# Patient Record
Sex: FEMALE | Race: WHITE | NOT HISPANIC OR LATINO
[De-identification: names, ages, dates, MRNs, and addresses within clinical notes are randomized per-mention and may not be internally consistent; named-entity substitution may affect disease eponyms.]

---

## 2020-08-13 ENCOUNTER — APPOINTMENT (OUTPATIENT)
Dept: DISASTER EMERGENCY | Facility: CLINIC | Age: 75
End: 2020-08-13

## 2020-08-17 LAB — SARS-COV-2 N GENE NPH QL NAA+PROBE: NOT DETECTED

## 2021-06-09 ENCOUNTER — OUTPATIENT (OUTPATIENT)
Dept: OUTPATIENT SERVICES | Facility: HOSPITAL | Age: 76
LOS: 1 days | Discharge: HOME | End: 2021-06-09
Payer: MEDICARE

## 2021-06-09 VITALS
TEMPERATURE: 98 F | HEART RATE: 66 BPM | WEIGHT: 152.78 LBS | DIASTOLIC BLOOD PRESSURE: 82 MMHG | RESPIRATION RATE: 16 BRPM | HEIGHT: 57 IN | SYSTOLIC BLOOD PRESSURE: 138 MMHG | OXYGEN SATURATION: 96 %

## 2021-06-09 DIAGNOSIS — Z01.818 ENCOUNTER FOR OTHER PREPROCEDURAL EXAMINATION: ICD-10-CM

## 2021-06-09 DIAGNOSIS — Z98.890 OTHER SPECIFIED POSTPROCEDURAL STATES: Chronic | ICD-10-CM

## 2021-06-09 DIAGNOSIS — N32.81 OVERACTIVE BLADDER: ICD-10-CM

## 2021-06-09 DIAGNOSIS — Z90.89 ACQUIRED ABSENCE OF OTHER ORGANS: Chronic | ICD-10-CM

## 2021-06-09 DIAGNOSIS — M19.011 PRIMARY OSTEOARTHRITIS, RIGHT SHOULDER: ICD-10-CM

## 2021-06-09 DIAGNOSIS — I10 ESSENTIAL (PRIMARY) HYPERTENSION: ICD-10-CM

## 2021-06-09 DIAGNOSIS — E07.9 DISORDER OF THYROID, UNSPECIFIED: ICD-10-CM

## 2021-06-09 LAB
ANION GAP SERPL CALC-SCNC: 8 MMOL/L — SIGNIFICANT CHANGE UP (ref 5–17)
APTT BLD: 31.3 SEC — SIGNIFICANT CHANGE UP (ref 27.5–35.5)
BLD GP AB SCN SERPL QL: SIGNIFICANT CHANGE UP
BUN SERPL-MCNC: 12 MG/DL — SIGNIFICANT CHANGE UP (ref 7–23)
CALCIUM SERPL-MCNC: 9.1 MG/DL — SIGNIFICANT CHANGE UP (ref 8.5–10.1)
CHLORIDE SERPL-SCNC: 107 MMOL/L — SIGNIFICANT CHANGE UP (ref 96–108)
CO2 SERPL-SCNC: 27 MMOL/L — SIGNIFICANT CHANGE UP (ref 22–31)
CREAT SERPL-MCNC: 1.02 MG/DL — SIGNIFICANT CHANGE UP (ref 0.5–1.3)
GLUCOSE SERPL-MCNC: 84 MG/DL — SIGNIFICANT CHANGE UP (ref 70–99)
HCT VFR BLD CALC: 37.6 % — SIGNIFICANT CHANGE UP (ref 34.5–45)
HGB BLD-MCNC: 12.7 G/DL — SIGNIFICANT CHANGE UP (ref 11.5–15.5)
INR BLD: 1.04 RATIO — SIGNIFICANT CHANGE UP (ref 0.88–1.16)
MCHC RBC-ENTMCNC: 29.7 PG — SIGNIFICANT CHANGE UP (ref 27–34)
MCHC RBC-ENTMCNC: 33.8 GM/DL — SIGNIFICANT CHANGE UP (ref 32–36)
MCV RBC AUTO: 88.1 FL — SIGNIFICANT CHANGE UP (ref 80–100)
NRBC # BLD: 0 /100 WBCS — SIGNIFICANT CHANGE UP (ref 0–0)
PLATELET # BLD AUTO: 273 K/UL — SIGNIFICANT CHANGE UP (ref 150–400)
POTASSIUM SERPL-MCNC: 4.4 MMOL/L — SIGNIFICANT CHANGE UP (ref 3.5–5.3)
POTASSIUM SERPL-SCNC: 4.4 MMOL/L — SIGNIFICANT CHANGE UP (ref 3.5–5.3)
PROTHROM AB SERPL-ACNC: 12 SEC — SIGNIFICANT CHANGE UP (ref 10.6–13.6)
RBC # BLD: 4.27 M/UL — SIGNIFICANT CHANGE UP (ref 3.8–5.2)
RBC # FLD: 13.4 % — SIGNIFICANT CHANGE UP (ref 10.3–14.5)
SODIUM SERPL-SCNC: 142 MMOL/L — SIGNIFICANT CHANGE UP (ref 135–145)
WBC # BLD: 5.4 K/UL — SIGNIFICANT CHANGE UP (ref 3.8–10.5)
WBC # FLD AUTO: 5.4 K/UL — SIGNIFICANT CHANGE UP (ref 3.8–10.5)

## 2021-06-09 PROCEDURE — 93010 ELECTROCARDIOGRAM REPORT: CPT

## 2021-06-09 NOTE — H&P PST ADULT - ASSESSMENT
75F pmh htn, gerd, thyroid disease, OAB c/o right shoulder pain 2/2 primary osteoarthritis here for PST for scheduled Right reverse shoulder arthroplasty  CAPRINI SCORE    AGE RELATED RISK FACTORS                                                       MOBILITY RELATED FACTORS  [ ] Age 41-60 years                                            (1 Point)                  [ ] Bed rest                                                        (1 Point)  [x ] Age: 61-74 years                                           (2 Points)                [ ] Plaster cast                                                   (2 Points)  [ ] Age= 75 years                                              (3 Points)                 [ ] Bed bound for more than 72 hours                   (2 Points)    DISEASE RELATED RISK FACTORS                                               GENDER SPECIFIC FACTORS  [ ] Edema in the lower extremities                       (1 Point)                  [ ] Pregnancy                                                     (1 Point)  [ ] Varicose veins                                               (1 Point)                  [ ] Post-partum < 6 weeks                                   (1 Point)             [x ] BMI > 25 Kg/m2                                            (1 Point)                  [ ] Hormonal therapy  or oral contraception            (1 Point)                 [ ] Sepsis (in the previous month)                        (1 Point)                  [ ] History of pregnancy complications  [ ] Pneumonia or serious lung disease                                               [ ] Unexplained or recurrent                       (1 Point)           (in the previous month)                               (1 Point)  [ ] Abnormal pulmonary function test                     (1 Point)                 SURGERY RELATED RISK FACTORS  [ ] Acute myocardial infarction                              (1 Point)                 [ ]  Section                                            (1 Point)  [ ] Congestive heart failure (in the previous month)  (1 Point)                 [ ] Minor surgery                                                 (1 Point)   [ ] Inflammatory bowel disease                             (1 Point)                 [ ] Arthroscopic surgery                                        (2 Points)  [ ] Central venous access                                    (2 Points)                [ ] General surgery lasting more than 45 minutes   (2 Points)       [ ] Stroke (in the previous month)                          (5 Points)               x[ ] Elective arthroplasty                                        (5 Points)                                                                                                                                               HEMATOLOGY RELATED FACTORS                                                 TRAUMA RELATED RISK FACTORS  [ ] Prior episodes of VTE                                     (3 Points)                 [ ] Fracture of the hip, pelvis, or leg                       (5 Points)  [ ] Positive family history for VTE                         (3 Points)                 [ ] Acute spinal cord injury (in the previous month)  (5 Points)  [ ] Prothrombin 30457 A                                      (3 Points)                 [ ] Paralysis  (less than 1 month)                          (5 Points)  [ ] Factor V Leiden                                             (3 Points)                 [ ] Multiple Trauma within 1 month                         (5 Points)  [ ] Lupus anticoagulants                                     (3 Points)                                                           [ ] Anticardiolipin antibodies                                (3 Points)                                                       [ ] High homocysteine in the blood                      (3 Points)                                             [ ] Other congenital or acquired thrombophilia       (3 Points)                                                [ ] Heparin induced thrombocytopenia                  (3 Points)                                          Total Score [    8      ]

## 2021-06-09 NOTE — H&P PST ADULT - NSICDXPROBLEM_GEN_ALL_CORE_FT
PROBLEM DIAGNOSES  Problem: Primary osteoarthritis, right shoulder  Assessment and Plan: Right reverse shoulder arthroplasty  labs - cbc,pt/ptt,bmp,t&s,nose cx,ekg  M/C required  preop 3 day hibiclens instruction reviewed and given .instructed on if  nose cx positive use mupuricin 5 days and checklist given  take routine meds DOS with sips of water. avoid NSAID and OTC supplements. verbalized understanding  information on proper nutrition , increase protein and better food choices provided in packet   Ensure clear given      Problem: Thyroid disease  Assessment and Plan: Continue current regimen and medications.     Problem: HTN (hypertension)  Assessment and Plan: Continue current regimen and medications.     Problem: OAB (overactive bladder)  Assessment and Plan: Continue current regimen and medications.

## 2021-06-10 LAB
A1C WITH ESTIMATED AVERAGE GLUCOSE RESULT: 5.9 % — HIGH (ref 4–5.6)
ESTIMATED AVERAGE GLUCOSE: 123 MG/DL — HIGH (ref 68–114)
MRSA PCR RESULT.: SIGNIFICANT CHANGE UP
S AUREUS DNA NOSE QL NAA+PROBE: DETECTED

## 2021-06-11 RX ORDER — MUPIROCIN 20 MG/G
1 OINTMENT TOPICAL
Qty: 22 | Refills: 0
Start: 2021-06-11 | End: 2021-06-15

## 2021-06-23 RX ORDER — PANTOPRAZOLE SODIUM 20 MG/1
40 TABLET, DELAYED RELEASE ORAL
Refills: 0 | Status: DISCONTINUED | OUTPATIENT
Start: 2021-06-28 | End: 2021-06-29

## 2021-06-23 RX ORDER — OXYBUTYNIN CHLORIDE 5 MG
5 TABLET ORAL THREE TIMES A DAY
Refills: 0 | Status: DISCONTINUED | OUTPATIENT
Start: 2021-06-28 | End: 2021-06-29

## 2021-06-23 RX ORDER — OXYCODONE HYDROCHLORIDE 5 MG/1
10 TABLET ORAL EVERY 4 HOURS
Refills: 0 | Status: DISCONTINUED | OUTPATIENT
Start: 2021-06-28 | End: 2021-06-29

## 2021-06-23 RX ORDER — POLYETHYLENE GLYCOL 3350 17 G/17G
17 POWDER, FOR SOLUTION ORAL AT BEDTIME
Refills: 0 | Status: DISCONTINUED | OUTPATIENT
Start: 2021-06-28 | End: 2021-06-29

## 2021-06-23 RX ORDER — SENNA PLUS 8.6 MG/1
2 TABLET ORAL AT BEDTIME
Refills: 0 | Status: DISCONTINUED | OUTPATIENT
Start: 2021-06-28 | End: 2021-06-29

## 2021-06-23 RX ORDER — ONDANSETRON 8 MG/1
8 TABLET, FILM COATED ORAL EVERY 8 HOURS
Refills: 0 | Status: DISCONTINUED | OUTPATIENT
Start: 2021-06-28 | End: 2021-06-29

## 2021-06-23 RX ORDER — SODIUM CHLORIDE 9 MG/ML
1000 INJECTION, SOLUTION INTRAVENOUS
Refills: 0 | Status: DISCONTINUED | OUTPATIENT
Start: 2021-06-28 | End: 2021-06-29

## 2021-06-23 RX ORDER — METOPROLOL TARTRATE 50 MG
50 TABLET ORAL DAILY
Refills: 0 | Status: DISCONTINUED | OUTPATIENT
Start: 2021-06-28 | End: 2021-06-29

## 2021-06-23 RX ORDER — ASPIRIN/CALCIUM CARB/MAGNESIUM 324 MG
325 TABLET ORAL DAILY
Refills: 0 | Status: DISCONTINUED | OUTPATIENT
Start: 2021-06-29 | End: 2021-06-29

## 2021-06-23 RX ORDER — LOSARTAN POTASSIUM 100 MG/1
50 TABLET, FILM COATED ORAL DAILY
Refills: 0 | Status: DISCONTINUED | OUTPATIENT
Start: 2021-06-28 | End: 2021-06-29

## 2021-06-23 RX ORDER — OXYCODONE HYDROCHLORIDE 5 MG/1
5 TABLET ORAL EVERY 4 HOURS
Refills: 0 | Status: DISCONTINUED | OUTPATIENT
Start: 2021-06-28 | End: 2021-06-29

## 2021-06-23 RX ORDER — LEVOTHYROXINE SODIUM 125 MCG
75 TABLET ORAL DAILY
Refills: 0 | Status: DISCONTINUED | OUTPATIENT
Start: 2021-06-28 | End: 2021-06-29

## 2021-06-25 ENCOUNTER — APPOINTMENT (OUTPATIENT)
Dept: DISASTER EMERGENCY | Facility: CLINIC | Age: 76
End: 2021-06-25

## 2021-06-27 ENCOUNTER — TRANSCRIPTION ENCOUNTER (OUTPATIENT)
Age: 76
End: 2021-06-27

## 2021-06-28 ENCOUNTER — RESULT REVIEW (OUTPATIENT)
Age: 76
End: 2021-06-28

## 2021-06-28 ENCOUNTER — TRANSCRIPTION ENCOUNTER (OUTPATIENT)
Age: 76
End: 2021-06-28

## 2021-06-28 ENCOUNTER — INPATIENT (INPATIENT)
Facility: HOSPITAL | Age: 76
LOS: 0 days | Discharge: HOME | End: 2021-06-29
Attending: ORTHOPAEDIC SURGERY | Admitting: ORTHOPAEDIC SURGERY
Payer: MEDICARE

## 2021-06-28 VITALS
RESPIRATION RATE: 18 BRPM | TEMPERATURE: 98 F | SYSTOLIC BLOOD PRESSURE: 123 MMHG | HEIGHT: 57 IN | WEIGHT: 152.78 LBS | DIASTOLIC BLOOD PRESSURE: 72 MMHG | OXYGEN SATURATION: 97 % | HEART RATE: 61 BPM

## 2021-06-28 DIAGNOSIS — Z90.89 ACQUIRED ABSENCE OF OTHER ORGANS: Chronic | ICD-10-CM

## 2021-06-28 DIAGNOSIS — Z98.890 OTHER SPECIFIED POSTPROCEDURAL STATES: Chronic | ICD-10-CM

## 2021-06-28 PROCEDURE — 88309 TISSUE EXAM BY PATHOLOGIST: CPT | Mod: 26

## 2021-06-28 PROCEDURE — 88311 DECALCIFY TISSUE: CPT | Mod: 26

## 2021-06-28 RX ORDER — FENTANYL CITRATE 50 UG/ML
25 INJECTION INTRAVENOUS
Refills: 0 | Status: DISCONTINUED | OUTPATIENT
Start: 2021-06-28 | End: 2021-06-28

## 2021-06-28 RX ORDER — ACETAMINOPHEN 500 MG
1000 TABLET ORAL ONCE
Refills: 0 | Status: DISCONTINUED | OUTPATIENT
Start: 2021-06-28 | End: 2021-06-28

## 2021-06-28 RX ORDER — BENZOCAINE AND MENTHOL 5; 1 G/100ML; G/100ML
1 LIQUID ORAL
Refills: 0 | Status: DISCONTINUED | OUTPATIENT
Start: 2021-06-28 | End: 2021-06-29

## 2021-06-28 RX ORDER — SODIUM CHLORIDE 9 MG/ML
1000 INJECTION, SOLUTION INTRAVENOUS
Refills: 0 | Status: DISCONTINUED | OUTPATIENT
Start: 2021-06-28 | End: 2021-06-28

## 2021-06-28 RX ORDER — METOCLOPRAMIDE HCL 10 MG
10 TABLET ORAL EVERY 8 HOURS
Refills: 0 | Status: DISCONTINUED | OUTPATIENT
Start: 2021-06-28 | End: 2021-06-29

## 2021-06-28 RX ORDER — KETOROLAC TROMETHAMINE 30 MG/ML
30 SYRINGE (ML) INJECTION EVERY 8 HOURS
Refills: 0 | Status: DISCONTINUED | OUTPATIENT
Start: 2021-06-28 | End: 2021-06-29

## 2021-06-28 RX ORDER — ACETAMINOPHEN 500 MG
1000 TABLET ORAL EVERY 8 HOURS
Refills: 0 | Status: COMPLETED | OUTPATIENT
Start: 2021-06-28 | End: 2021-06-29

## 2021-06-28 RX ORDER — SODIUM CHLORIDE 9 MG/ML
3 INJECTION INTRAMUSCULAR; INTRAVENOUS; SUBCUTANEOUS EVERY 8 HOURS
Refills: 0 | Status: DISCONTINUED | OUTPATIENT
Start: 2021-06-28 | End: 2021-06-28

## 2021-06-28 RX ORDER — METOCLOPRAMIDE HCL 10 MG
10 TABLET ORAL ONCE
Refills: 0 | Status: DISCONTINUED | OUTPATIENT
Start: 2021-06-28 | End: 2021-06-28

## 2021-06-28 RX ORDER — CEFAZOLIN SODIUM 1 G
2000 VIAL (EA) INJECTION EVERY 8 HOURS
Refills: 0 | Status: COMPLETED | OUTPATIENT
Start: 2021-06-28 | End: 2021-06-29

## 2021-06-28 RX ORDER — FENTANYL CITRATE 50 UG/ML
50 INJECTION INTRAVENOUS
Refills: 0 | Status: DISCONTINUED | OUTPATIENT
Start: 2021-06-28 | End: 2021-06-28

## 2021-06-28 RX ADMIN — SODIUM CHLORIDE 100 MILLILITER(S): 9 INJECTION, SOLUTION INTRAVENOUS at 15:51

## 2021-06-28 RX ADMIN — POLYETHYLENE GLYCOL 3350 17 GRAM(S): 17 POWDER, FOR SOLUTION ORAL at 21:04

## 2021-06-28 RX ADMIN — Medication 400 MILLIGRAM(S): at 21:04

## 2021-06-28 RX ADMIN — SENNA PLUS 2 TABLET(S): 8.6 TABLET ORAL at 21:04

## 2021-06-28 RX ADMIN — Medication 10 MILLIGRAM(S): at 17:59

## 2021-06-28 RX ADMIN — Medication 1000 MILLIGRAM(S): at 21:18

## 2021-06-28 RX ADMIN — Medication 5 MILLIGRAM(S): at 21:12

## 2021-06-28 RX ADMIN — ONDANSETRON 8 MILLIGRAM(S): 8 TABLET, FILM COATED ORAL at 16:25

## 2021-06-28 RX ADMIN — Medication 100 MILLIGRAM(S): at 21:07

## 2021-06-28 RX ADMIN — Medication 30 MILLIGRAM(S): at 21:18

## 2021-06-28 RX ADMIN — SODIUM CHLORIDE 100 MILLILITER(S): 9 INJECTION, SOLUTION INTRAVENOUS at 16:28

## 2021-06-28 RX ADMIN — Medication 30 MILLIGRAM(S): at 21:04

## 2021-06-28 NOTE — PHYSICAL THERAPY INITIAL EVALUATION ADULT - PLANNED THERAPY INTERVENTIONS, PT EVAL
Independent in stair climbing using one or two rails and/or using appropriate walking device safely./balance training/bed mobility training/gait training/strengthening/transfer training

## 2021-06-28 NOTE — PHYSICAL THERAPY INITIAL EVALUATION ADULT - RANGE OF MOTION EXAMINATION, REHAB EVAL
R UE on a sling: no ROM on shoulder, Ok on elbows, wrist, digits./Left UE ROM was WFL (within functional limits)/bilateral lower extremity ROM was WFL (within functional limits)/deficits as listed below

## 2021-06-28 NOTE — DISCHARGE NOTE PROVIDER - NSDCCPTREATMENT_GEN_ALL_CORE_FT
PRINCIPAL PROCEDURE  Procedure: Reverse total shoulder replacement  Findings and Treatment: left       PRINCIPAL PROCEDURE  Procedure: Reverse total shoulder replacement  Findings and Treatment: right

## 2021-06-28 NOTE — ASU PREOP CHECKLIST - 1.
Patient had recent hospital stay for pneumonia, had follow up chest xray today. Dr. Geraldine Hernandez office called to tell her to go to ED for abnormal results. No complaints at this time. pain management

## 2021-06-28 NOTE — DISCHARGE NOTE PROVIDER - NSDCFUADDAPPT_GEN_ALL_CORE_FT
Follow up with your surgeon in two weeks. Call for appointment.  If you need more pain medication, call your surgeon's office. For medication refills or authorizations, please call [phone] xt [phone]  We recommend that you call and schedule a follow up appointment with your primary care physician for repeat blood work (CBC and BMP) for post hospital discharge follow-up care 2-4 weeks after your surgery.   Call your surgeon if you have increased redness/pain/drainage or fever. Return to ER for shortness of breath/calf tenderness.

## 2021-06-28 NOTE — DISCHARGE NOTE PROVIDER - NSDCMRMEDTOKEN_GEN_ALL_CORE_FT
levothyroxine 75 mcg (0.075 mg) oral capsule: 1 cap(s) orally once a day  losartan 50 mg oral tablet: 1 tab(s) orally once a day  metoprolol succinate 50 mg oral tablet, extended release: 1 tab(s) orally once a day  mupirocin 2% topical ointment: Apply topically to affected area 2 times a day  intranasaly   omeprazole 20 mg oral delayed release capsule: 1 cap(s) orally once a day  oxybutynin 15 mg/24 hr oral tablet, extended release: 1 tab(s) orally once a day   Aspirin Enteric Coated 325 mg oral delayed release tablet: 1 tab(s) orally once a day MDD:1  levothyroxine 75 mcg (0.075 mg) oral capsule: 1 cap(s) orally once a day  losartan 50 mg oral tablet: 1 tab(s) orally once a day  metoprolol succinate 50 mg oral tablet, extended release: 1 tab(s) orally once a day  oxybutynin 15 mg/24 hr oral tablet, extended release: 1 tab(s) orally once a day  oxyCODONE 5 mg oral tablet: 1- 2 tab(s) orally every 4 hours, As Needed -Pain MDD:10  polyethylene glycol 3350 oral powder for reconstitution: 17 gram(s) orally once a day (at bedtime)  senna oral tablet: 2 tab(s) orally once a day (at bedtime)  Tylenol 325 mg oral tablet: 2 tab(s) orally every 4 hours

## 2021-06-28 NOTE — CONSULT NOTE ADULT - SUBJECTIVE AND OBJECTIVE BOX
TIKI ABDI is a 75y Female s/p RIGHT REVERSE SHOULDER ARTHROPLASTY      w/ h/o HTN (hypertension)    GERD (gastroesophageal reflux disease)    Thyroid disease    History of basal cell cancer    OAB (overactive bladder)      denies any chest pain shortness of breath palpitation dizziness lightheadedness nausea vomiting fever or chills    S/P tonsillectomy    S/P rotator cuff repair    S/P skin biopsy        SH: doesnot smoke or drink at this time    penicillin (Other; Rash)  sulfa drugs (Pruritus)    acetaminophen  IVPB .. 1000 milliGRAM(s) IV Intermittent every 8 hours  benzocaine 15 mG/menthol 3.6 mG (Sugar-Free) Lozenge 1 Lozenge Oral every 2 hours PRN  ceFAZolin   IVPB 2000 milliGRAM(s) IV Intermittent every 8 hours  ketorolac   Injectable 30 milliGRAM(s) IV Push every 8 hours  lactated ringers. 1000 milliLiter(s) IV Continuous <Continuous>  levothyroxine 75 MICROGram(s) Oral daily  losartan 50 milliGRAM(s) Oral daily  metoclopramide Injectable 10 milliGRAM(s) IV Push every 8 hours PRN  metoprolol succinate ER 50 milliGRAM(s) Oral daily  ondansetron Injectable 8 milliGRAM(s) IV Push every 8 hours PRN  oxybutynin 5 milliGRAM(s) Oral three times a day  oxyCODONE    IR 5 milliGRAM(s) Oral every 4 hours PRN  oxyCODONE    IR 10 milliGRAM(s) Oral every 4 hours PRN  pantoprazole    Tablet 40 milliGRAM(s) Oral before breakfast  polyethylene glycol 3350 17 Gram(s) Oral at bedtime  senna 2 Tablet(s) Oral at bedtime    T(C): 36.4 (06-28-21 @ 17:58), Max: 36.9 (06-28-21 @ 15:59)  HR: 72 (06-28-21 @ 17:58) (61 - 82)  BP: 129/85 (06-28-21 @ 17:58) (123/72 - 163/98)  RR: 18 (06-28-21 @ 17:58) (15 - 21)  SpO2: 96% (06-28-21 @ 17:58) (91% - 100%)  HEENT unremarkable  neck no JVD or bruit  heart normal S1 S2 RRR no gallops or rubs  chest clear to auscultation  abd sof nontender non distended +bs  ext no calf tenderness    A/P   DVT PX  pain control  bowel regimen   wound care as per ortho  GI PX  antiemetics prn  incentive spirometer

## 2021-06-28 NOTE — PHYSICAL THERAPY INITIAL EVALUATION ADULT - GAIT TRAINING, PT EVAL
Independent in ambulation with use of no assistive device up to 200 feet observing proper gait pattern, posture and use of walking device safely.

## 2021-06-28 NOTE — DISCHARGE NOTE PROVIDER - REASON FOR ADMISSION
osteoarthritis of left shoulder rotator cuff tear of left shoulder rotator cuff tear of right shoulder

## 2021-06-28 NOTE — PHYSICAL THERAPY INITIAL EVALUATION ADULT - IMPAIRMENTS FOUND, PT EVAL
aerobic capacity/endurance/gait, locomotion, and balance/integumentary integrity/joint integrity and mobility/posture/ROM

## 2021-06-28 NOTE — PHYSICAL THERAPY INITIAL EVALUATION ADULT - STRENGTHENING, PT EVAL
Improve strength in B UE to 5/5 and be able to perform functional tasks-bed mobility, sitting, standing, transfers and ambulate in a safe manner with or without  assistive device and prevent falls.

## 2021-06-28 NOTE — PHYSICAL THERAPY INITIAL EVALUATION ADULT - CRITERIA FOR SKILLED THERAPEUTIC INTERVENTIONS
home with outpatient services./impairments found/functional limitations in following categories/risk reduction/prevention/rehab potential/therapy frequency/predicted duration of therapy intervention/anticipated discharge recommendation

## 2021-06-28 NOTE — PHYSICAL THERAPY INITIAL EVALUATION ADULT - ACTIVE RANGE OF MOTION EXAMINATION, REHAB EVAL
R UE on a sling: no ROM on shoulder, Ok on elbows, wrist, digits./Left UE Active ROM was WFL (within functional limits)/bilateral  lower extremity Active ROM was WFL (within functional limits)

## 2021-06-28 NOTE — PHYSICAL THERAPY INITIAL EVALUATION ADULT - GENERAL OBSERVATIONS, REHAB EVAL
Chart reviewed, pt encountered on supine, AxOx4, + IV removed before ambulation, + sling on R UE, pt's  and daughter present.

## 2021-06-28 NOTE — DISCHARGE NOTE PROVIDER - CARE PROVIDER_API CALL
Frank Reese  ORTHOPAEDIC SURGERY  [address] [place] [address]  Phone: [phone]  Fax: [phone]  Follow Up Time:

## 2021-06-28 NOTE — DISCHARGE NOTE PROVIDER - HOSPITAL COURSE
75yFemale with history of   osteoarthritis of left shoulder  presenting for left reverse total shoulder arthroplasty  by  Dr Frank Reese  on  6/28/2021. Risk and benefits of surgery were explained to the patient. The patient understood and agreed to proceed with surgery. Patient underwent the procedure with no intraoperative complications. Pt was brought in stable condition to the PACU. Once stable in PACU, pt was brought to the floor. During hospital stay pt was followed by Medicine,  during this admission. Pt had an uneventful hospital course. Pt is stable for discharge to home on POD#1 75yFemale with history of  rotator cuff tear of left shoulder  presenting for left reverse total shoulder arthroplasty  by  Dr Frank Reese  on  6/28/2021. Risk and benefits of surgery were explained to the patient. The patient understood and agreed to proceed with surgery. Patient underwent the procedure with no intraoperative complications. Pt was brought in stable condition to the PACU. Once stable in PACU, pt was brought to the floor. During hospital stay pt was followed by Medicine,  during this admission. Pt had an uneventful hospital course. Pt is stable for discharge to home on POD#1 75yFemale with history of  rotator cuff tear of right shoulder  presenting for right reverse total shoulder arthroplasty  by  Dr Frank Reese  on  6/28/2021. Risk and benefits of surgery were explained to the patient. The patient understood and agreed to proceed with surgery. Patient underwent the procedure with no intraoperative complications. Pt was brought in stable condition to the PACU. Once stable in PACU, pt was brought to the floor. During hospital stay pt was followed by Medicine,  during this admission. Pt had an uneventful hospital course. Pt is stable for discharge to home on POD#1

## 2021-06-28 NOTE — PHYSICAL THERAPY INITIAL EVALUATION ADULT - ADDITIONAL COMMENTS
as per patient, she lives with  on a  with 5 stairs with 2 HR's (widely spread) inside no more steps to negotiate. pt was able to ambulate Independently with no AD, pt still drives, Pt still works occasionally as teacher assistance.

## 2021-06-28 NOTE — DISCHARGE NOTE PROVIDER - NSDCCPCAREPLAN_GEN_ALL_CORE_FT
PRINCIPAL DISCHARGE DIAGNOSIS  Diagnosis: Osteoarthritis of left shoulder  Assessment and Plan of Treatment:        PRINCIPAL DISCHARGE DIAGNOSIS  Diagnosis: Complete rotator cuff tear or rupture of left shoulder, not specified as traumatic  Assessment and Plan of Treatment:        PRINCIPAL DISCHARGE DIAGNOSIS  Diagnosis: Complete rotator cuff tear or rupture of right shoulder, not specified as traumatic  Assessment and Plan of Treatment:

## 2021-06-29 ENCOUNTER — TRANSCRIPTION ENCOUNTER (OUTPATIENT)
Age: 76
End: 2021-06-29

## 2021-06-29 VITALS
OXYGEN SATURATION: 95 % | HEART RATE: 65 BPM | DIASTOLIC BLOOD PRESSURE: 70 MMHG | RESPIRATION RATE: 20 BRPM | SYSTOLIC BLOOD PRESSURE: 122 MMHG

## 2021-06-29 LAB
ANION GAP SERPL CALC-SCNC: 7 MMOL/L — SIGNIFICANT CHANGE UP (ref 5–17)
BUN SERPL-MCNC: 15 MG/DL — SIGNIFICANT CHANGE UP (ref 7–23)
CALCIUM SERPL-MCNC: 8.5 MG/DL — SIGNIFICANT CHANGE UP (ref 8.5–10.1)
CHLORIDE SERPL-SCNC: 108 MMOL/L — SIGNIFICANT CHANGE UP (ref 96–108)
CO2 SERPL-SCNC: 24 MMOL/L — SIGNIFICANT CHANGE UP (ref 22–31)
CREAT SERPL-MCNC: 0.98 MG/DL — SIGNIFICANT CHANGE UP (ref 0.5–1.3)
GLUCOSE SERPL-MCNC: 120 MG/DL — HIGH (ref 70–99)
HCT VFR BLD CALC: 33.9 % — LOW (ref 34.5–45)
HGB BLD-MCNC: 11.6 G/DL — SIGNIFICANT CHANGE UP (ref 11.5–15.5)
MCHC RBC-ENTMCNC: 29.7 PG — SIGNIFICANT CHANGE UP (ref 27–34)
MCHC RBC-ENTMCNC: 34.2 GM/DL — SIGNIFICANT CHANGE UP (ref 32–36)
MCV RBC AUTO: 86.7 FL — SIGNIFICANT CHANGE UP (ref 80–100)
NRBC # BLD: 0 /100 WBCS — SIGNIFICANT CHANGE UP (ref 0–0)
PLATELET # BLD AUTO: 227 K/UL — SIGNIFICANT CHANGE UP (ref 150–400)
POTASSIUM SERPL-MCNC: 3.8 MMOL/L — SIGNIFICANT CHANGE UP (ref 3.5–5.3)
POTASSIUM SERPL-SCNC: 3.8 MMOL/L — SIGNIFICANT CHANGE UP (ref 3.5–5.3)
RBC # BLD: 3.91 M/UL — SIGNIFICANT CHANGE UP (ref 3.8–5.2)
RBC # FLD: 12.9 % — SIGNIFICANT CHANGE UP (ref 10.3–14.5)
SODIUM SERPL-SCNC: 139 MMOL/L — SIGNIFICANT CHANGE UP (ref 135–145)
WBC # BLD: 9.48 K/UL — SIGNIFICANT CHANGE UP (ref 3.8–10.5)
WBC # FLD AUTO: 9.48 K/UL — SIGNIFICANT CHANGE UP (ref 3.8–10.5)

## 2021-06-29 PROCEDURE — 73030 X-RAY EXAM OF SHOULDER: CPT | Mod: 26,RT

## 2021-06-29 RX ORDER — ASPIRIN/CALCIUM CARB/MAGNESIUM 324 MG
1 TABLET ORAL
Qty: 14 | Refills: 0
Start: 2021-06-29 | End: 2021-07-12

## 2021-06-29 RX ORDER — OMEPRAZOLE 10 MG/1
1 CAPSULE, DELAYED RELEASE ORAL
Qty: 0 | Refills: 0 | DISCHARGE

## 2021-06-29 RX ORDER — SENNA PLUS 8.6 MG/1
2 TABLET ORAL
Qty: 0 | Refills: 0 | DISCHARGE
Start: 2021-06-29

## 2021-06-29 RX ORDER — OXYCODONE HYDROCHLORIDE 5 MG/1
2 TABLET ORAL
Qty: 50 | Refills: 0
Start: 2021-06-29 | End: 2021-07-03

## 2021-06-29 RX ORDER — POLYETHYLENE GLYCOL 3350 17 G/17G
17 POWDER, FOR SOLUTION ORAL
Qty: 0 | Refills: 0 | DISCHARGE
Start: 2021-06-29

## 2021-06-29 RX ADMIN — Medication 1000 MILLIGRAM(S): at 05:51

## 2021-06-29 RX ADMIN — Medication 30 MILLIGRAM(S): at 05:41

## 2021-06-29 RX ADMIN — Medication 30 MILLIGRAM(S): at 05:50

## 2021-06-29 RX ADMIN — OXYCODONE HYDROCHLORIDE 5 MILLIGRAM(S): 5 TABLET ORAL at 11:41

## 2021-06-29 RX ADMIN — Medication 100 MILLIGRAM(S): at 05:41

## 2021-06-29 RX ADMIN — PANTOPRAZOLE SODIUM 40 MILLIGRAM(S): 20 TABLET, DELAYED RELEASE ORAL at 05:41

## 2021-06-29 RX ADMIN — Medication 30 MILLIGRAM(S): at 13:08

## 2021-06-29 RX ADMIN — Medication 50 MILLIGRAM(S): at 05:40

## 2021-06-29 RX ADMIN — Medication 75 MICROGRAM(S): at 05:40

## 2021-06-29 RX ADMIN — Medication 400 MILLIGRAM(S): at 13:08

## 2021-06-29 RX ADMIN — Medication 5 MILLIGRAM(S): at 05:41

## 2021-06-29 RX ADMIN — Medication 400 MILLIGRAM(S): at 05:41

## 2021-06-29 RX ADMIN — OXYCODONE HYDROCHLORIDE 5 MILLIGRAM(S): 5 TABLET ORAL at 12:38

## 2021-06-29 RX ADMIN — Medication 325 MILLIGRAM(S): at 11:41

## 2021-06-29 NOTE — DISCHARGE NOTE NURSING/CASE MANAGEMENT/SOCIAL WORK - PATIENT PORTAL LINK FT
You can access the FollowMyHealth Patient Portal offered by [facility] by registering at the following website: [email or web address]. By joining [facility]’s FollowMyHealth portal, you will also be able to view your health information using other applications (apps) compatible with our system.

## 2021-06-29 NOTE — PROGRESS NOTE ADULT - SUBJECTIVE AND OBJECTIVE BOX
75yFemale s/p r reverse TSA POD #0  Pt seen and examined in NAD.   Pain controlled.   Pt denies any new complaints.   Pt denies CP/SOB/N/V/D/numbness/tingling/bowel or bladder dysfunction.     Vital Signs Last 24 Hrs  T(C): 36.9 (28 Jun 2021 15:59), Max: 36.9 (28 Jun 2021 15:59)  T(F): 98.4 (28 Jun 2021 15:59), Max: 98.4 (28 Jun 2021 15:59)  HR: 82 (28 Jun 2021 15:59) (61 - 82)  BP: 136/87 (28 Jun 2021 15:59) (123/72 - 163/98)  BP(mean): --  RR: 18 (28 Jun 2021 15:59) (15 - 21)  SpO2: 91% (28 Jun 2021 15:59) (91% - 100%)    PE:   General: NAD, A&Ox3  RUE: Prineo dressing. Arm in sling. Decreased motor/sensation 2/2 to nerve block.  RP2+ NVI.   LUE: Skin intact. +ROM shoulder/elbow/wrist/fingers. +ok/thumbsup/fingercross signs.  strength: 5/5.  RP2+ NVI.                      A/P: 75yFemale s/p R reverse TSA POD#0  Prineo Dressing  Pain controlled  PT: NWB RUE  F/U morning Xray  DVT ppx: SCDs and   Wound care, Isometric exercises, incentive spirometry   Discharge: planning   All the above discussed and understood by pt   
75yFemale s/p right reverse TSA POD#1. Pt seen and examined in NAD. Pain controlled. Pt denies any new complaints. Pt denies CP/SOB/N/V/D/bowel or bladder dysfunction.     PE:   Neuro: AAOX3  RUE: Prineo dressing C/D/I. Sling in place. +ROM elbow/wrist/fingers. +ok/thumbsup/fingercross signs.  strength: 5/5.  RP2+ NVI.  LUE: Skin intact. +ROM shoulder/elbow/wrist/fingers. +ok/thumbsup/fingercross signs.  strength: 5/5.  RP2+ NVI.   B/L LE: Skin intact. +ROM hip/knee/ankle/toes. Ankle Dorsi/plantarflexion: 5/5. Calf: soft, compressible and nontender. DP/PT 2+ NVI.                             11.6   9.48  )-----------( 227      ( 29 Jun 2021 07:26 )             33.9       06-29    139  |  108  |  15  ----------------------------<  120<H>  3.8   |  24  |  0.98    Ca    8.5      29 Jun 2021 07:26          A/P: 75yFemale s/p right reverse TSA POD#1.   F/U AM post op Xray   Pain controlled  PT/OT: NWB RUE. No ROM to shoulder   DVT ppx: SCDs ASA 325mg daily   Wound care, Isometric exercises, incentive spirometry   Medical consult appreciated  Discharge: planning home today  All the above discussed and understood by pt   D/W Dr Reese
TIKI ABDI is a 75y Female s/p RIGHT REVERSE SHOULDER ARTHROPLASTY        denies any chest pain shortness of breath palpitation dizziness lightheadedness nausea vomiting fever or chills    T(C): 36.7 (06-29-21 @ 03:00), Max: 36.9 (06-28-21 @ 15:59)  HR: 61 (06-29-21 @ 09:25) (61 - 86)  BP: 135/66 (06-29-21 @ 09:25) (104/68 - 163/98)  RR: 18 (06-29-21 @ 09:25) (15 - 21)  SpO2: 96% (06-29-21 @ 09:25) (91% - 100%)  no jvd/bruit  s1 s2 rrr  cta  s/nt/nd  no calf tend                        11.6   9.48  )-----------( 227      ( 29 Jun 2021 07:26 )             33.9   06-29    139  |  108  |  15  ----------------------------<  120<H>  3.8   |  24  |  0.98    Ca    8.5      29 Jun 2021 07:26        cont dvt px  pain control  bowel regimen  antiemetics  incentive spirometer

## 2021-06-29 NOTE — OCCUPATIONAL THERAPY INITIAL EVALUATION ADULT - TRANSFER TRAINING, PT EVAL
Patient will be able to perform functional transfers, while maintaining surgical precautions, independently within 1 week.

## 2021-06-29 NOTE — OCCUPATIONAL THERAPY INITIAL EVALUATION ADULT - GENERAL OBSERVATIONS, REHAB EVAL
Pt encountered seated in recliner at bedside, NAD, AXOX4, c/o tingling in R hand s/p R reverse TSA, sling donned to GARFIELD, GARFIELD support pillow in place, dressing c/d/i, +heplock.

## 2021-06-29 NOTE — OCCUPATIONAL THERAPY INITIAL EVALUATION ADULT - ADDITIONAL COMMENTS
Pt lives in private home c spouse (able to assist upon d/c) c 5 stairs to enters B/L handrails. Once inside, pt's bedroom/bathroom are located on main level. Pt's bathroom is equipped c a tub/shower, +GB and standard height toilet seat. Pt is right-hand dominant, wears glasses for reading and distance and currently drives. Pt reported she has commode at home and  will set it up for her prior to d/c.

## 2021-06-29 NOTE — OCCUPATIONAL THERAPY INITIAL EVALUATION ADULT - STRENGTHENING, PT EVAL
Pt will increase right lower extremity strength to 5/5 to improve functional strength needed to engage in functional tasks by 2-4 weeks

## 2021-06-29 NOTE — OCCUPATIONAL THERAPY INITIAL EVALUATION ADULT - BALANCE TRAINING, PT EVAL
Patient will be able to increase static and dynamic sitting/standing by 1/2 grade in order to participate in self care tasks and functional mobility/transfers within 2-4 weeks

## 2021-06-29 NOTE — OCCUPATIONAL THERAPY INITIAL EVALUATION ADULT - ADL RETRAINING, OT EVAL
Pt will perform upper body dressing while maintaining surgical precautions c supervision in 2-4 weeks.

## 2021-06-29 NOTE — DISCHARGE NOTE NURSING/CASE MANAGEMENT/SOCIAL WORK - NSDCFUADDAPPT_GEN_ALL_CORE_FT
Follow up with your surgeon in two weeks. Call for appointment.  If you need more pain medication, call your surgeon's office. For medication refills or authorizations, please call [phone] xt [phone]  We recommend that you call and schedule a follow up appointment with your primary care physician for repeat blood work (CBC and BMP) for post hospital discharge follow-up care 2-4 weeks after your surgery.   Call your surgeon if you have increased redness/pain/drainage or fever. Return to ER for shortness of breath/calf tenderness.

## 2021-07-07 DIAGNOSIS — E66.9 OBESITY, UNSPECIFIED: ICD-10-CM

## 2021-07-07 DIAGNOSIS — M19.011 PRIMARY OSTEOARTHRITIS, RIGHT SHOULDER: ICD-10-CM

## 2021-07-07 DIAGNOSIS — E03.9 HYPOTHYROIDISM, UNSPECIFIED: ICD-10-CM

## 2021-07-07 DIAGNOSIS — M75.121 COMPLETE ROTATOR CUFF TEAR OR RUPTURE OF RIGHT SHOULDER, NOT SPECIFIED AS TRAUMATIC: ICD-10-CM

## 2021-07-07 DIAGNOSIS — Z88.2 ALLERGY STATUS TO SULFONAMIDES: ICD-10-CM

## 2021-07-07 DIAGNOSIS — Z85.828 PERSONAL HISTORY OF OTHER MALIGNANT NEOPLASM OF SKIN: ICD-10-CM

## 2021-07-07 DIAGNOSIS — E11.9 TYPE 2 DIABETES MELLITUS WITHOUT COMPLICATIONS: ICD-10-CM

## 2021-07-07 DIAGNOSIS — N32.81 OVERACTIVE BLADDER: ICD-10-CM

## 2021-07-07 DIAGNOSIS — K76.0 FATTY (CHANGE OF) LIVER, NOT ELSEWHERE CLASSIFIED: ICD-10-CM

## 2021-07-07 DIAGNOSIS — K21.9 GASTRO-ESOPHAGEAL REFLUX DISEASE WITHOUT ESOPHAGITIS: ICD-10-CM

## 2021-07-07 DIAGNOSIS — Z88.0 ALLERGY STATUS TO PENICILLIN: ICD-10-CM

## 2021-07-07 DIAGNOSIS — I10 ESSENTIAL (PRIMARY) HYPERTENSION: ICD-10-CM

## 2022-03-27 PROBLEM — Z85.828 PERSONAL HISTORY OF OTHER MALIGNANT NEOPLASM OF SKIN: Chronic | Status: ACTIVE | Noted: 2021-06-09

## 2022-03-27 PROBLEM — N32.81 OVERACTIVE BLADDER: Chronic | Status: ACTIVE | Noted: 2021-06-09

## 2022-03-27 PROBLEM — I10 ESSENTIAL (PRIMARY) HYPERTENSION: Chronic | Status: ACTIVE | Noted: 2021-06-09

## 2022-03-27 PROBLEM — K21.9 GASTRO-ESOPHAGEAL REFLUX DISEASE WITHOUT ESOPHAGITIS: Chronic | Status: ACTIVE | Noted: 2021-06-09

## 2022-03-27 PROBLEM — E07.9 DISORDER OF THYROID, UNSPECIFIED: Chronic | Status: ACTIVE | Noted: 2021-06-09

## 2022-04-26 ENCOUNTER — APPOINTMENT (OUTPATIENT)
Dept: ORTHOPEDIC SURGERY | Facility: CLINIC | Age: 77
End: 2022-04-26
Payer: MEDICARE

## 2022-04-26 VITALS — BODY MASS INDEX: 31.93 KG/M2 | WEIGHT: 148 LBS | HEIGHT: 57 IN

## 2022-04-26 DIAGNOSIS — Z78.9 OTHER SPECIFIED HEALTH STATUS: ICD-10-CM

## 2022-04-26 PROCEDURE — 99212 OFFICE O/P EST SF 10 MIN: CPT

## 2022-04-29 NOTE — ASSESSMENT
[FreeTextEntry1] : PT WITH SEVERE RT KNEE PAIN FOR MANY MONTHS, WITHOUT INJURY. PAIN WORSENS WITH WALKING LONG\par DISTANCES AND STAIRS. PAIN IS AFFECTING ADL AND FUNCTIONAL ACTIVITIES. XRAYS REVIEWED WITH MODERATE\par OA. TREATMENT OPTIONS REVIEWED. RT KNEE CSI 02.01.22 PROVIDED DECENT TEMPORARY RELIEF. HAD A DETAILED\par DISCUSSION ON SX. WILL REQUEST FOR RT KNEE EUFLEXXA. \par \par \par PMHX: \par DENIES DVT/PE, METAL ALERGIES.

## 2022-04-29 NOTE — HISTORY OF PRESENT ILLNESS
[10] : 10 [5] : 5 [Intermittent] : intermittent [Standing] : standing [Walking] : walking [Stairs] : stairs [Retired] : Work status: retired [de-identified] : here for left knee visco [] : Post Surgical Visit: no [FreeTextEntry1] : RIGHT KNEE [FreeTextEntry5] : follow up with right knee\par Pt received  cortisone inj last time, states helped with the pain but knee still has tweaks

## 2022-05-13 ENCOUNTER — APPOINTMENT (OUTPATIENT)
Dept: ORTHOPEDIC SURGERY | Facility: CLINIC | Age: 77
End: 2022-05-13
Payer: MEDICARE

## 2022-05-13 VITALS — BODY MASS INDEX: 31.93 KG/M2 | HEIGHT: 57 IN | WEIGHT: 148 LBS

## 2022-05-13 DIAGNOSIS — I10 ESSENTIAL (PRIMARY) HYPERTENSION: ICD-10-CM

## 2022-05-13 PROCEDURE — J3490M: CUSTOM

## 2022-05-13 PROCEDURE — 20610 DRAIN/INJ JOINT/BURSA W/O US: CPT | Mod: RT

## 2022-05-13 PROCEDURE — 99214 OFFICE O/P EST MOD 30 MIN: CPT | Mod: 25

## 2022-05-13 NOTE — HISTORY OF PRESENT ILLNESS
[8] : 8 [5] : 5 [Dull/Aching] : dull/aching [Intermittent] : intermittent [Standing] : standing [Walking] : walking [Stairs] : stairs [Retired] : Work status: retired [1] : 1 [Orthovisc] : Orthovisc [de-identified] : here for Rt  knee visco [] : Post Surgical Visit: no [FreeTextEntry1] : RIGHT KNEE [FreeTextEntry5] : follow up with right knee\par Pt received  cortisone inj last time, states helped with the pain but knee still has tweaks [de-identified] : 5/13/22 [de-identified] : rt knee [TWNoteComboBox1] : 0%

## 2022-05-13 NOTE — ASSESSMENT
[FreeTextEntry1] : PT WITH SEVERE RT KNEE PAIN FOR MANY MONTHS, WITHOUT INJURY. PAIN WORSENS WITH WALKING LONG\par DISTANCES AND STAIRS. PAIN IS AFFECTING ADL AND FUNCTIONAL ACTIVITIES. XRAYS REVIEWED WITH MODERATE\par OA. TREATMENT OPTIONS REVIEWED. RT KNEE CSI 02.01.22 PROVIDED DECENT TEMPORARY RELIEF. HAD A DETAILED\par DISCUSSION ON SX.  \par \par \par \par \par PMHX: \par DENIES DVT/PE, METAL ALERGIES.

## 2022-05-13 NOTE — PROCEDURE
[de-identified] : Procedure Name: Orthovisc (Large Joint)\par \par Viscosupplementation Injection: X-ray evidence of Osteoarthritis on this or prior visit, Patient has tried OTC's including aspirin, Ibuprofen, Aleve etc or prescription NSAIDS, and/or exercises at home and/ or physical therapy without satisfactory response and Repeat series performed because patient had significant improvement in their pain and functional capacity from prior series which was given more than six months ago. \par \par An injection of Orthovisc 2ml #1 was injected into the right knee(s). The risks, benefits, and alternatives to Viscosupplementation injection were explained in full to the patient. Risks outlined include but are not limited to infection, sepsis, bleeding, scarring, skin discoloration, temporary increase in pain, syncopal episode, failure to resolve symptoms, allergic reaction, and symptom recurrence. Signs and symptoms of infection reviewed and patient advised to call immediately for redness, fevers, and/or chills. Patient understood the risks. All questions were answered. After discussion of options, patient requested Viscosupplementation. Oral informed consent was obtained and sterile prep was done of the injection site. The patient tolerated the procedure well. Ice tonight to the injection site. \par

## 2022-05-13 NOTE — IMAGING
[de-identified] : Left Hip/Thigh: Inspection of the hip/thigh is as follows: Inspection shows no swelling. Range of motion of the hip is\par as follows: limited internal rotation and limited external rotation. \par Right Knee: Inspection of the knee is as follows: no effusion, erythema, ecchymosis, scars or deformities. Palpation\par of the knee is as follows: medial joint line tenderness, medial facet of patella tenderness, patellar\par compression tenderness and crepitus about the patella. Knee Range of Motion is as follows: full flexion and\par extension without pain (0-140). Strength examination of the knee is as follows: Quadriceps strength is 5/5 Hamstring\par strength is 5/5 Ligament Stability and Special Test ligamentously stable. Neurological examination of the knee is as\par follows: light touch is intact throughout.

## 2022-05-20 ENCOUNTER — APPOINTMENT (OUTPATIENT)
Dept: ORTHOPEDIC SURGERY | Facility: CLINIC | Age: 77
End: 2022-05-20

## 2022-05-20 ENCOUNTER — APPOINTMENT (OUTPATIENT)
Age: 77
End: 2022-05-20
Payer: MEDICARE

## 2022-05-20 VITALS — BODY MASS INDEX: 31.93 KG/M2 | WEIGHT: 148 LBS | HEIGHT: 57 IN

## 2022-05-20 PROCEDURE — 99213 OFFICE O/P EST LOW 20 MIN: CPT | Mod: 25

## 2022-05-20 PROCEDURE — 20610 DRAIN/INJ JOINT/BURSA W/O US: CPT | Mod: RT

## 2022-05-20 NOTE — ASSESSMENT
[FreeTextEntry1] : Orthovisc #2 administered in office today to right knee. Patient tolerated procedure well. Will follow up in 1 week.

## 2022-05-20 NOTE — HISTORY OF PRESENT ILLNESS
[8] : 8 [4] : 4 [Dull/Aching] : dull/aching [Tightness] : tightness [Squeezing] : squeezing [Constant] : constant [Walking] : walking [2] : 2 [Orthovisc] : Orthovisc [de-identified] : 5/20/22- Patient is here for Orthovisc injection #2 right knee.  [] : no [FreeTextEntry1] : rt knee [de-identified] : RT knee [de-identified] : Orthovisc  [TWNoteComboBox1] : 0%

## 2022-05-20 NOTE — IMAGING
[de-identified] : Left Hip/Thigh: Inspection of the hip/thigh is as follows: Inspection shows no swelling. Range of motion of the hip is\par as follows: limited internal rotation and limited external rotation. \par Right Knee: Inspection of the knee is as follows: no effusion, erythema, ecchymosis, scars or deformities. Palpation\par of the knee is as follows: medial joint line tenderness, medial facet of patella tenderness, patellar\par compression tenderness and crepitus about the patella. Knee Range of Motion is as follows: full flexion and\par extension without pain (0-140). Strength examination of the knee is as follows: Quadriceps strength is 5/5 Hamstring\par strength is 5/5 Ligament Stability and Special Test ligamentously stable. Neurological examination of the knee is as\par follows: light touch is intact throughout.

## 2022-05-20 NOTE — PROCEDURE
[FreeTextEntry3] : \par Procedure Name: Orthovisc (Large Joint)\par \par Viscosupplementation Injection: X-ray evidence of Osteoarthritis on this or prior visit, Patient has tried OTC's including aspirin, Ibuprofen, Aleve etc or prescription NSAIDS, and/or exercises at home and/ or physical therapy without satisfactory response and Repeat series performed because patient had significant improvement in their pain and functional capacity from prior series which was given more than six months ago. \par \par An injection of Orthovisc 2ml #1 was injected into the right knee(s). The risks, benefits, and alternatives to Viscosupplementation injection were explained in full to the patient. Risks outlined include but are not limited to infection, sepsis, bleeding, scarring, skin discoloration, temporary increase in pain, syncopal episode, failure to resolve symptoms, allergic reaction, and symptom recurrence. Signs and symptoms of infection reviewed and patient advised to call immediately for redness, fevers, and/or chills. Patient understood the risks. All questions were answered. After discussion of options, patient requested Viscosupplementation. Oral informed consent was obtained and sterile prep was done of the injection site. The patient tolerated the procedure well. Ice tonight to the injection site. \par was performed. \par

## 2022-05-27 ENCOUNTER — APPOINTMENT (OUTPATIENT)
Dept: ORTHOPEDIC SURGERY | Facility: CLINIC | Age: 77
End: 2022-05-27
Payer: MEDICARE

## 2022-05-27 ENCOUNTER — APPOINTMENT (OUTPATIENT)
Dept: ORTHOPEDIC SURGERY | Facility: CLINIC | Age: 77
End: 2022-05-27

## 2022-05-27 VITALS — BODY MASS INDEX: 31.93 KG/M2 | WEIGHT: 148 LBS | HEIGHT: 57 IN

## 2022-05-27 PROCEDURE — 20610 DRAIN/INJ JOINT/BURSA W/O US: CPT

## 2022-05-27 PROCEDURE — 99213 OFFICE O/P EST LOW 20 MIN: CPT | Mod: 25

## 2022-06-03 ENCOUNTER — APPOINTMENT (OUTPATIENT)
Dept: ORTHOPEDIC SURGERY | Facility: CLINIC | Age: 77
End: 2022-06-03
Payer: MEDICARE

## 2022-06-03 ENCOUNTER — APPOINTMENT (OUTPATIENT)
Dept: ORTHOPEDIC SURGERY | Facility: CLINIC | Age: 77
End: 2022-06-03

## 2022-06-03 VITALS — WEIGHT: 148 LBS | BODY MASS INDEX: 31.93 KG/M2 | HEIGHT: 57 IN

## 2022-06-03 PROCEDURE — 99213 OFFICE O/P EST LOW 20 MIN: CPT | Mod: 25

## 2022-06-03 PROCEDURE — 20610 DRAIN/INJ JOINT/BURSA W/O US: CPT

## 2022-06-03 NOTE — PROCEDURE
[Orthovisc] : Orthovisc [#2] : series #2 [Call if redness, pain or fever occur] : call if redness, pain or fever occur [Apply ice for 15min out of every hour for the next 12-24 hours as tolerated] : apply ice for 15 minutes out of every hour for the next 12-24 hours as tolerated [Patient was advised to rest the joint(s) for ____ days] : patient was advised to rest the joint(s) for [unfilled] days [de-identified] : Procedure Name: Orthovisc (Large Joint)\par \par Viscosupplementation Injection: X-ray evidence of Osteoarthritis on this or prior visit, Patient has tried OTC's including aspirin, Ibuprofen, Aleve etc or prescription NSAIDS, and/or exercises at home and/ or physical therapy without satisfactory response and Repeat series performed because patient had significant improvement in their pain and functional capacity from prior series which was given more than six months ago. \par \par An injection of Orthovisc 2ml #1 was injected into the right knee(s). The risks, benefits, and alternatives to Viscosupplementation injection were explained in full to the patient. Risks outlined include but are not limited to infection, sepsis, bleeding, scarring, skin discoloration, temporary increase in pain, syncopal episode, failure to resolve symptoms, allergic reaction, and symptom recurrence. Signs and symptoms of infection reviewed and patient advised to call immediately for redness, fevers, and/or chills. Patient understood the risks. All questions were answered. After discussion of options, patient requested Viscosupplementation. Oral informed consent was obtained and sterile prep was done of the injection site. The patient tolerated the procedure well. Ice tonight to the injection site. \par

## 2022-06-03 NOTE — PROCEDURE
[Right] : of the right [Knee] : knee [Pain] : pain [Inflammation] : inflammation [X-ray evidence of Osteoarthritis on this or prior visit] : x-ray evidence of Osteoarthritis on this or prior visit [Alcohol] : alcohol [Betadine] : betadine [Ethyl Chloride sprayed topically] : ethyl chloride sprayed topically [Sterile technique used] : sterile technique used [Orthovisc] : Orthovisc [#4] : series #4 [] : Patient tolerated procedure well [Call if redness, pain or fever occur] : call if redness, pain or fever occur [Apply ice for 15min out of every hour for the next 12-24 hours as tolerated] : apply ice for 15 minutes out of every hour for the next 12-24 hours as tolerated [Patient was advised to rest the joint(s) for ____ days] : patient was advised to rest the joint(s) for [unfilled] days [Previous OTC use and PT nontherapeutic] : patient has tried OTC's including aspirin, Ibuprofen, Aleve, etc or prescription NSAIDS, and/or exercises at home and/or physical therapy without satisfactory response [Patient had decreased mobility in the joint] : patient had decreased mobility in the joint [Risks, benefits, alternatives discussed / Verbal consent obtained] : the risks benefits, and alternatives have been discussed, and verbal consent was obtained [de-identified] : Procedure Name: Orthovisc (Large Joint)\par \par Viscosupplementation Injection: X-ray evidence of Osteoarthritis on this or prior visit, Patient has tried OTC's including aspirin, Ibuprofen, Aleve etc or prescription NSAIDS, and/or exercises at home and/ or physical therapy without satisfactory response and Repeat series performed because patient had significant improvement in their pain and functional capacity from prior series which was given more than six months ago. \par \par An injection of Orthovisc 2ml #1 was injected into the right knee(s). The risks, benefits, and alternatives to Viscosupplementation injection were explained in full to the patient. Risks outlined include but are not limited to infection, sepsis, bleeding, scarring, skin discoloration, temporary increase in pain, syncopal episode, failure to resolve symptoms, allergic reaction, and symptom recurrence. Signs and symptoms of infection reviewed and patient advised to call immediately for redness, fevers, and/or chills. Patient understood the risks. All questions were answered. After discussion of options, patient requested Viscosupplementation. Oral informed consent was obtained and sterile prep was done of the injection site. The patient tolerated the procedure well. Ice tonight to the injection site. \par

## 2022-06-03 NOTE — IMAGING
[de-identified] : Left Hip/Thigh: Inspection of the hip/thigh is as follows: Inspection shows no swelling. Range of motion of the hip is\par as follows: limited internal rotation and limited external rotation. \par Right Knee: Inspection of the knee is as follows: no effusion, erythema, ecchymosis, scars or deformities. Palpation\par of the knee is as follows: medial joint line tenderness, medial facet of patella tenderness, patellar\par compression tenderness and crepitus about the patella. Knee Range of Motion is as follows: full flexion and\par extension without pain (0-140). Strength examination of the knee is as follows: Quadriceps strength is 5/5 Hamstring\par strength is 5/5 Ligament Stability and Special Test ligamentously stable. Neurological examination of the knee is as\par follows: light touch is intact throughout.

## 2022-06-03 NOTE — HISTORY OF PRESENT ILLNESS
[Orthovisc] : Orthovisc [8] : 8 [5] : 5 [Dull/Aching] : dull/aching [Intermittent] : intermittent [Standing] : standing [Walking] : walking [Stairs] : stairs [Retired] : Work status: retired [1] : 1 [de-identified] : here for Rt  knee visco [] : Post Surgical Visit: no [FreeTextEntry1] : RIGHT KNEE [FreeTextEntry5] : follow up with right knee\par Pt received  ORTHOVISC [de-identified] : 5/13/22 [de-identified] : rt knee [TWNoteComboBox1] : 0%

## 2022-06-03 NOTE — IMAGING
[de-identified] : Left Hip/Thigh: Inspection of the hip/thigh is as follows: Inspection shows no swelling. Range of motion of the hip is\par as follows: limited internal rotation and limited external rotation. \par Right Knee: Inspection of the knee is as follows: no effusion, erythema, ecchymosis, scars or deformities. Palpation\par of the knee is as follows: medial joint line tenderness, medial facet of patella tenderness, patellar\par compression tenderness and crepitus about the patella. Knee Range of Motion is as follows: full flexion and\par extension without pain (0-140). Strength examination of the knee is as follows: Quadriceps strength is 5/5 Hamstring\par strength is 5/5 Ligament Stability and Special Test ligamentously stable. Neurological examination of the knee is as\par follows: light touch is intact throughout.

## 2022-06-03 NOTE — ASSESSMENT
[FreeTextEntry1] : 76 year old R knee OA\par \par Orthovisc #2 given today\par post injection instructions \par discussed gentle motion exercises\par \par

## 2022-06-03 NOTE — HISTORY OF PRESENT ILLNESS
[8] : 8 [5] : 5 [Dull/Aching] : dull/aching [Intermittent] : intermittent [Standing] : standing [Walking] : walking [Stairs] : stairs [Retired] : Work status: retired [Orthovisc] : Orthovisc [1] : 1 [de-identified] : here for Rt  knee visco [] : Post Surgical Visit: no [FreeTextEntry1] : RIGHT KNEE [FreeTextEntry5] : follow up with right knee\par Pt received  cortisone inj last time, states helped with the pain but knee still has tweaks [de-identified] : 5/13/22 [de-identified] : rt knee [TWNoteComboBox1] : 0%

## 2022-06-28 ENCOUNTER — APPOINTMENT (OUTPATIENT)
Dept: ORTHOPEDIC SURGERY | Facility: CLINIC | Age: 77
End: 2022-06-28
Payer: MEDICARE

## 2022-06-28 VITALS — HEIGHT: 57 IN | WEIGHT: 148 LBS | BODY MASS INDEX: 31.93 KG/M2

## 2022-06-28 DIAGNOSIS — M19.011 PRIMARY OSTEOARTHRITIS, RIGHT SHOULDER: ICD-10-CM

## 2022-06-28 DIAGNOSIS — Z98.890 OTHER SPECIFIED POSTPROCEDURAL STATES: ICD-10-CM

## 2022-06-28 DIAGNOSIS — Z78.9 OTHER SPECIFIED HEALTH STATUS: ICD-10-CM

## 2022-06-28 PROCEDURE — 73010 X-RAY EXAM OF SHOULDER BLADE: CPT | Mod: RT

## 2022-06-28 PROCEDURE — 99213 OFFICE O/P EST LOW 20 MIN: CPT

## 2022-06-28 PROCEDURE — 73030 X-RAY EXAM OF SHOULDER: CPT | Mod: RT

## 2022-06-28 NOTE — PHYSICAL EXAM
[Right] : right shoulder [] : no tenderness to palpation [Components well fixed, in good position] : Components well fixed, in good position [FreeTextEntry9] : FF: 130 \par ER: 50\par IR: L2 [FreeTextEntry1] : Hardware in proper alignment

## 2022-06-28 NOTE — ASSESSMENT
This was a shared visit with the LIA. I reviewed and verified the documentation and independently performed the documented: [FreeTextEntry1] : S/p R RSA (6/28/21)\par Continue HEP\par RTO 1 year \par Xray at next visit

## 2022-06-28 NOTE — HISTORY OF PRESENT ILLNESS
[2] : 2 [0] : 0 [Retired] : Work status: retired [de-identified] : DOS: 6/28/21 R RSA\par \par 6/28/22: Here for follow up. Doing well without complaints. Notes some limitation with internal rotation persists. Has completed PT but continues HEP. \par \par 12/28/21: Here 6 month follow up. She does an HEP. She has some limitation with overhead and IR.\par \par 9/28/21: Here for follow up, now about 12 weeks. She is in PT with improvement.\par \par 8/17/21: Here for follow up, now about 6 weeks. She is in PT with improvement.\par \par 7/15/21: Here for first follow up. She has some pain in the shoulder. She is in the sling.\par \par 6/3/21: She got good initial relief from the injection but now the pain is starting to return.\par \par 4/20/21: 76 y/o RHD female here today for the R shoulder. She ahs had pain in the shoulder for about 20 years. she has pain ant and deep. She has radiating pain down to the fingers. She denies paresthesias. She has pain in the neck. She tried PT for about 6 weeks. She had a CSI about one year ago. She had surgery with Dr. Pendleton on the left side with mild relief. \par \par PMHx: HTN, HypoT \par \par MRI R shoulder: \par 1. Full-thickness tearing of the supraspinatus tendon repair with 3-4 cm of retraction and moderate surrounding bursitis with disproportionate supraspinatus muscle atrophy and to a less degree infraspinatus and teres minor muscle atrophy.\par 2. Postoperative changes associated with subacromial decompression, glenohumeral joint debridement, and biceps tenotomy with mild degenerative changes in the glenohumeral joint, moderate glenohumeral joint effusion, moderate subacromial bursitis without evidence of acute osseous injury. Clinical correlation is recommended.\par \par Dr. Godfrey Notes:\par 4/16/21: This is a 75 year old RHD retired F  with bilateral shoulder pain. the left started after a fall onto her outstretched arm in late October 2019. Dr. Pendleton performed surgery on 8/18/20 which included a Left Shoulder Arthroscopy, Glenohumeral Debridement, Biceps Tenotomy, Medium Rotator Cuff Repair (DR), Synovectomy, Subacromial Decompression. This side is doing OK. The right has been an issue for years, tho worse since early Jan 2021. The Medrol helped minimally. The MRI on the right was done. The PT also hasn't helped much. Celebrex has caused reflux. Her  is here.\par \par  [FreeTextEntry1] : R shoulder

## 2022-07-12 ENCOUNTER — APPOINTMENT (OUTPATIENT)
Dept: ORTHOPEDIC SURGERY | Facility: CLINIC | Age: 77
End: 2022-07-12

## 2022-07-12 VITALS — WEIGHT: 148 LBS | HEIGHT: 57 IN | BODY MASS INDEX: 31.93 KG/M2

## 2022-07-12 PROCEDURE — 20610 DRAIN/INJ JOINT/BURSA W/O US: CPT

## 2022-07-12 PROCEDURE — J3490M: CUSTOM

## 2022-07-12 PROCEDURE — 99214 OFFICE O/P EST MOD 30 MIN: CPT | Mod: 25

## 2022-07-12 RX ORDER — DICLOFENAC SODIUM 1% 10 MG/G
1 GEL TOPICAL
Qty: 100 | Refills: 3 | Status: ACTIVE | COMMUNITY
Start: 2022-07-12 | End: 1900-01-01

## 2022-07-12 NOTE — HISTORY OF PRESENT ILLNESS
[Dull/Aching] : dull/aching [Intermittent] : intermittent [Standing] : standing [Walking] : walking [Stairs] : stairs [Retired] : Work status: retired [1] : 1 [Orthovisc] : Orthovisc [7] : 7 [de-identified] : here for Rt  knee visco [] : Post Surgical Visit: no [FreeTextEntry1] : RIGHT KNEE [FreeTextEntry5] : follow up with right knee\par finished orthovisc last time  [de-identified] : 5/13/22 [de-identified] : rt knee [TWNoteComboBox1] : 0%

## 2022-07-12 NOTE — IMAGING
[de-identified] : Left Hip/Thigh: Inspection of the hip/thigh is as follows: Inspection shows no swelling. Range of motion of the hip is\par as follows: limited internal rotation and limited external rotation. \par Right Knee: Inspection of the knee is as follows: no effusion, erythema, ecchymosis, scars or deformities. Palpation\par of the knee is as follows: medial joint line tenderness, medial facet of patella tenderness, patellar\par compression tenderness and crepitus about the patella. Knee Range of Motion is as follows: full flexion and\par extension without pain (0-140). Strength examination of the knee is as follows: Quadriceps strength is 5/5 Hamstring\par strength is 5/5 Ligament Stability and Special Test ligamentously stable. Neurological examination of the knee is as\par follows: light touch is intact throughout.

## 2022-07-12 NOTE — ASSESSMENT
[FreeTextEntry1] : PT WITH SEVERE RT KNEE PAIN FOR MANY MONTHS, WITHOUT INJURY. PAIN WORSENS WITH WALKING LONG\par DISTANCES AND STAIRS. PAIN IS AFFECTING ADL AND FUNCTIONAL ACTIVITIES. XRAYS REVIEWED WITH MODERATE OA. TREATMENT OPTIONS REVIEWED. RT KNEE CSI TODAY. HAD VISCO IN THE PAST WITH NO RELIEF.

## 2022-07-12 NOTE — PROCEDURE
[de-identified] : Procedure Name: Large Joint Injection / Aspiration: Depomedrol and Marcaine\par Anesthesia: ethyl chloride sprayed topically.. \par Depomedrol: An injection of Depomedrol 80 mg , 1 cc. \par Marcaine: 5 cc. \par Medication was injected in the right knee. Patient has tried OTC's including aspirin, Ibuprofen, Aleve etc or prescription\par NSAIDS, and/or exercises at home and/ or physical therapy without satisfactory response. After verbal consent using sterile preparation and technique. The risks, benefits, and alternatives to cortisone injection were explained in full to the patient. Risks outlined include but are not limited to infection, sepsis, bleeding, scarring, skin discoloration, temporary  increase in pain, syncopal episode, failure to resolve symptoms, allergic reaction, symptom recurrence, and elevation of blood sugar in diabetics. Patient understood the risks. All questions were answered. After discussion of options, patient requested an injection. Oral informed consent was obtained and sterile prep was done of the injection\par site. Sterile technique was utilized for the procedure including the preparation of the solutions used for the injection. Patient tolerated the procedure well. Advised to ice the injection site this evening. Prep with alcohol locally to site. Sterile technique used. Post Procedure Instructions: Patient was advised to call if redness, pain, or fever occur and\par apply ice for 15 min. out of every hour for the next 12-24 hours as tolerated.

## 2022-07-15 ENCOUNTER — APPOINTMENT (OUTPATIENT)
Dept: ORTHOPEDIC SURGERY | Facility: CLINIC | Age: 77
End: 2022-07-15

## 2022-07-18 ENCOUNTER — FORM ENCOUNTER (OUTPATIENT)
Age: 77
End: 2022-07-18

## 2022-10-28 ENCOUNTER — APPOINTMENT (OUTPATIENT)
Dept: OBGYN | Facility: CLINIC | Age: 77
End: 2022-10-28

## 2022-10-28 VITALS
SYSTOLIC BLOOD PRESSURE: 145 MMHG | DIASTOLIC BLOOD PRESSURE: 87 MMHG | HEART RATE: 69 BPM | WEIGHT: 148 LBS | BODY MASS INDEX: 32.03 KG/M2

## 2022-10-28 DIAGNOSIS — Z00.00 ENCOUNTER FOR GENERAL ADULT MEDICAL EXAMINATION W/OUT ABNORMAL FINDINGS: ICD-10-CM

## 2022-10-28 PROCEDURE — 99397 PER PM REEVAL EST PAT 65+ YR: CPT

## 2022-10-28 NOTE — HISTORY OF PRESENT ILLNESS
[FreeTextEntry1] : Patient is a 77-year-old  2 para 2 last menstrual period approximately 35 years ago\par Patient presents for annual visit,,, denies any complaints

## 2022-10-28 NOTE — PHYSICAL EXAM
[Chaperone Present] : A chaperone was present in the examining room during all aspects of the physical examination [Appropriately responsive] : appropriately responsive [Alert] : alert [No Acute Distress] : no acute distress [No Lymphadenopathy] : no lymphadenopathy [Regular Rate Rhythm] : regular rate rhythm [No Murmurs] : no murmurs [Clear to Auscultation B/L] : clear to auscultation bilaterally [Soft] : soft [Non-tender] : non-tender [Non-distended] : non-distended [No HSM] : No HSM [No Lesions] : no lesions [No Mass] : no mass [Oriented x3] : oriented x3 [Examination Of The Breasts] : a normal appearance [No Masses] : no breast masses were palpable [Vulvar Atrophy] : vulvar atrophy [Labia Majora] : normal [Labia Minora] : normal [Atrophy] : atrophy [Normal] : normal [Anteversion] : anteverted [Uterine Adnexae] : normal [FreeTextEntry6] : No masses, nontender, no skin change, nipple discharge, no adenopathy. [Tenderness] : nontender [Mass ___ cm] : no uterine mass was palpated [FreeTextEntry1] : Severe atrophy [FreeTextEntry4] : Severe atrophy [FreeTextEntry5] : History of vaginal vault

## 2022-10-28 NOTE — PLAN
[FreeTextEntry1] : Patient is 77-year-old  2 para 2 last menstrual period approximate 35 years ago\par Patient presents for annual visit,,, denies any complaints\par Physical exam reveals a well-developed well-nourished female in no apparent distress, calm, BMI 33\par Heart regular rhythm and rate, lungs clear, breast no mass nontender no skin lesion or nipple discharge, abdomen soft nontender no organomegaly.\par Pelvic exam shows normal female external genitalia, with extremely atrophic, vagina no lesions extremely atrophic, cervix flush with vaginal vault nontender, uterus anteverted small nontender, adnexa no mass nontender.\par Pap smear not performed\par Patient was given a prescription for breast mammogram\par Patient states that she had bone density done in May of thousand 21 with findings of osteopenia\par Patient states she had colonoscopy scheduled for \par Essentially benign GYN exam or for a menopausal patient\par Follow-up 1 year prior to that as needed

## 2023-01-26 ENCOUNTER — OFFICE (OUTPATIENT)
Dept: URBAN - METROPOLITAN AREA CLINIC 100 | Facility: CLINIC | Age: 78
Setting detail: OPHTHALMOLOGY
End: 2023-01-26
Payer: MEDICARE

## 2023-01-26 DIAGNOSIS — D49.2: ICD-10-CM

## 2023-01-26 DIAGNOSIS — H01.004: ICD-10-CM

## 2023-01-26 DIAGNOSIS — H16.223: ICD-10-CM

## 2023-01-26 DIAGNOSIS — H11.442: ICD-10-CM

## 2023-01-26 DIAGNOSIS — H01.001: ICD-10-CM

## 2023-01-26 DIAGNOSIS — C44.1122: ICD-10-CM

## 2023-01-26 PROBLEM — H00.12 REFRACTIVE ERROR: Status: ACTIVE | Noted: 2023-01-26

## 2023-01-26 PROCEDURE — 92285 EXTERNAL OCULAR PHOTOGRAPHY: CPT | Performed by: OPHTHALMOLOGY

## 2023-01-26 PROCEDURE — 92012 INTRM OPH EXAM EST PATIENT: CPT | Performed by: OPHTHALMOLOGY

## 2023-01-26 PROCEDURE — 11900 INJECT SKIN LESIONS </W 7: CPT | Performed by: OPHTHALMOLOGY

## 2023-01-26 ASSESSMENT — CONFRONTATIONAL VISUAL FIELD TEST (CVF)
OD_FINDINGS: FULL
OS_FINDINGS: FULL

## 2023-01-26 ASSESSMENT — LID EXAM ASSESSMENTS
OS_BLEPHARITIS: LUL
OD_TRICHIASIS: ABSENT
OD_BLEPHARITIS: RUL

## 2023-01-26 ASSESSMENT — VISUAL ACUITY
OS_BCVA: 20/40+2
OD_BCVA: 20/30-2

## 2023-03-01 ENCOUNTER — OFFICE (OUTPATIENT)
Dept: URBAN - METROPOLITAN AREA CLINIC 102 | Facility: CLINIC | Age: 78
Setting detail: OPHTHALMOLOGY
End: 2023-03-01
Payer: MEDICARE

## 2023-03-01 DIAGNOSIS — D49.2: ICD-10-CM

## 2023-03-01 DIAGNOSIS — C44.1122: ICD-10-CM

## 2023-03-01 PROCEDURE — 99213 OFFICE O/P EST LOW 20 MIN: CPT | Performed by: OPHTHALMOLOGY

## 2023-03-01 ASSESSMENT — LID EXAM ASSESSMENTS
OD_BLEPHARITIS: RUL
OS_BLEPHARITIS: LUL
OD_TRICHIASIS: ABSENT

## 2023-03-01 ASSESSMENT — VISUAL ACUITY
OD_BCVA: 20/25-1
OS_BCVA: 20/30-1

## 2023-03-01 ASSESSMENT — CONFRONTATIONAL VISUAL FIELD TEST (CVF)
OD_FINDINGS: FULL
OS_FINDINGS: FULL

## 2023-03-21 ENCOUNTER — APPOINTMENT (OUTPATIENT)
Dept: ORTHOPEDIC SURGERY | Facility: CLINIC | Age: 78
End: 2023-03-21
Payer: MEDICARE

## 2023-03-21 VITALS — BODY MASS INDEX: 31.93 KG/M2 | HEIGHT: 57 IN | WEIGHT: 148 LBS

## 2023-03-21 PROCEDURE — 73564 X-RAY EXAM KNEE 4 OR MORE: CPT | Mod: RT

## 2023-03-21 PROCEDURE — J3490M: CUSTOM

## 2023-03-21 PROCEDURE — 99213 OFFICE O/P EST LOW 20 MIN: CPT | Mod: 25

## 2023-03-21 PROCEDURE — L1833: CPT | Mod: RT

## 2023-03-21 PROCEDURE — 20610 DRAIN/INJ JOINT/BURSA W/O US: CPT | Mod: RT

## 2023-03-21 NOTE — PROCEDURE
[de-identified] : Procedure Name: Large Joint Injection / Aspiration: Depomedrol and Marcaine\par Anesthesia: ethyl chloride sprayed topically.. \par Depomedrol: An injection of Depomedrol 80 mg , 1 cc. \par Marcaine: 5 cc. \par Medication was injected in the right knee. Patient has tried OTC's including aspirin, Ibuprofen, Aleve etc or prescription\par NSAIDS, and/or exercises at home and/ or physical therapy without satisfactory response. After verbal consent using sterile preparation and technique. The risks, benefits, and alternatives to cortisone injection were explained in full to the patient. Risks outlined include but are not limited to infection, sepsis, bleeding, scarring, skin discoloration, temporary  increase in pain, syncopal episode, failure to resolve symptoms, allergic reaction, symptom recurrence, and elevation of blood sugar in diabetics. Patient understood the risks. All questions were answered. After discussion of options, patient requested an injection. Oral informed consent was obtained and sterile prep was done of the injection\par site. Sterile technique was utilized for the procedure including the preparation of the solutions used for the injection. Patient tolerated the procedure well. Advised to ice the injection site this evening. Prep with alcohol locally to site. Sterile technique used. Post Procedure Instructions: Patient was advised to call if redness, pain, or fever occur and\par apply ice for 15 min. out of every hour for the next 12-24 hours as tolerated.

## 2023-03-21 NOTE — IMAGING
[de-identified] : Left Hip/Thigh: Inspection of the hip/thigh is as follows: Inspection shows no swelling. Range of motion of the hip is\par as follows: limited internal rotation and limited external rotation. \par Right Knee: Inspection of the knee is as follows: no effusion, erythema, ecchymosis, scars or deformities. Palpation\par of the knee is as follows: medial joint line tenderness, medial facet of patella tenderness, patellar\par compression tenderness and crepitus about the patella. Knee Range of Motion is as follows: full flexion and\par extension without pain (0-140). Strength examination of the knee is as follows: Quadriceps strength is 5/5 Hamstring\par strength is 5/5 Ligament Stability and Special Test ligamentously stable. Neurological examination of the knee is as\par follows: light touch is intact throughout.

## 2023-03-21 NOTE — ASSESSMENT
[FreeTextEntry1] : PT WITH SEVERE RT KNEE PAIN FOR MANY MONTHS, WITHOUT INJURY. PAIN WORSENS WITH WALKING LONG\par DISTANCES AND STAIRS. PAIN IS AFFECTING ADL AND FUNCTIONAL ACTIVITIES. XRAYS REVIEWED WITH MODERATE OA. TREATMENT OPTIONS REVIEWED. RT KNEE CSI 7/12/22 WITH GOOD RELIEF. HAD VISCO IN THE PAST WITH NO RELIEF. \par \par RT KNEE HKB.\par RT KNEE CSI TODAY. PATIENT TOLERATED INJECTION WELL.

## 2023-03-21 NOTE — HISTORY OF PRESENT ILLNESS
[Gradual] : gradual [7] : 7 [Dull/Aching] : dull/aching [Localized] : localized [Intermittent] : intermittent [Rest] : rest [Injection therapy] : injection therapy [Standing] : standing [Walking] : walking [Stairs] : stairs [Retired] : Work status: retired [1] : 1 [Orthovisc] : Orthovisc [de-identified] : 03/21/2023 here for a follow up on the right knee had csi on 07/12/2022 with good relief  [] : Post Surgical Visit: no [FreeTextEntry1] : RIGHT KNEE [FreeTextEntry5] : follow up with right knee\par finished orthovisc last time  [de-identified] : csi 07/12/2022 [de-identified] : 5/13/22 [de-identified] : rt knee [TWNoteComboBox1] : 0%

## 2023-07-25 ENCOUNTER — APPOINTMENT (OUTPATIENT)
Dept: ORTHOPEDIC SURGERY | Facility: CLINIC | Age: 78
End: 2023-07-25
Payer: MEDICARE

## 2023-07-25 VITALS — BODY MASS INDEX: 31.28 KG/M2 | HEIGHT: 57 IN | WEIGHT: 145 LBS

## 2023-07-25 PROCEDURE — J3490M: CUSTOM

## 2023-07-25 PROCEDURE — 99213 OFFICE O/P EST LOW 20 MIN: CPT | Mod: 25

## 2023-07-25 PROCEDURE — 20610 DRAIN/INJ JOINT/BURSA W/O US: CPT | Mod: RT

## 2023-07-25 NOTE — IMAGING
[de-identified] : Left Hip/Thigh: Inspection of the hip/thigh is as follows: Inspection shows no swelling. Range of motion of the hip is\par as follows: limited internal rotation and limited external rotation. \par Right Knee: Inspection of the knee is as follows: no effusion, erythema, ecchymosis, scars or deformities. Palpation\par of the knee is as follows: medial joint line tenderness, medial facet of patella tenderness, patellar\par compression tenderness and crepitus about the patella. Knee Range of Motion is as follows: full flexion and\par extension without pain (0-140). Strength examination of the knee is as follows: Quadriceps strength is 5/5 Hamstring\par strength is 5/5 Ligament Stability and Special Test ligamentously stable. Neurological examination of the knee is as\par follows: light touch is intact throughout.

## 2023-07-25 NOTE — PROCEDURE
[de-identified] : Procedure Name: Large Joint Injection / Aspiration: Depomedrol and Marcaine\par Anesthesia: ethyl chloride sprayed topically.. \par Depomedrol: An injection of Depomedrol 80 mg , 1 cc. \par Marcaine: 5 cc. \par Medication was injected in the right knee. Patient has tried OTC's including aspirin, Ibuprofen, Aleve etc or prescription\par NSAIDS, and/or exercises at home and/ or physical therapy without satisfactory response. After verbal consent using sterile preparation and technique. The risks, benefits, and alternatives to cortisone injection were explained in full to the patient. Risks outlined include but are not limited to infection, sepsis, bleeding, scarring, skin discoloration, temporary  increase in pain, syncopal episode, failure to resolve symptoms, allergic reaction, symptom recurrence, and elevation of blood sugar in diabetics. Patient understood the risks. All questions were answered. After discussion of options, patient requested an injection. Oral informed consent was obtained and sterile prep was done of the injection\par site. Sterile technique was utilized for the procedure including the preparation of the solutions used for the injection. Patient tolerated the procedure well. Advised to ice the injection site this evening. Prep with alcohol locally to site. Sterile technique used. Post Procedure Instructions: Patient was advised to call if redness, pain, or fever occur and\par apply ice for 15 min. out of every hour for the next 12-24 hours as tolerated.

## 2023-07-25 NOTE — HISTORY OF PRESENT ILLNESS
[Right Leg] : right leg [Gradual] : gradual [7] : 7 [Dull/Aching] : dull/aching [Localized] : localized [Intermittent] : intermittent [Rest] : rest [Injection therapy] : injection therapy [Standing] : standing [Walking] : walking [Stairs] : stairs [Retired] : Work status: retired [1] : 1 [Orthovisc] : Orthovisc [de-identified] : 03/21/2023 here for a follow up on the right knee had csi on 07/12/2022 with good relief \par \par 07/25/23 follow up right knee ,had csi last visit with good relief  [] : Post Surgical Visit: no [FreeTextEntry1] : RIGHT KNEE [FreeTextEntry5] : follow up with right knee\par finished orthovisc last time  [de-identified] : csi 07/12/2022\par 03/21/2023 csi [de-identified] : 5/13/22 [de-identified] : rt knee [TWNoteComboBox1] : 0%

## 2023-09-25 ENCOUNTER — OFFICE (OUTPATIENT)
Dept: URBAN - METROPOLITAN AREA CLINIC 12 | Facility: CLINIC | Age: 78
Setting detail: OPHTHALMOLOGY
End: 2023-09-25
Payer: MEDICARE

## 2023-09-25 DIAGNOSIS — H11.442: ICD-10-CM

## 2023-09-25 DIAGNOSIS — H35.3131: ICD-10-CM

## 2023-09-25 DIAGNOSIS — H25.11: ICD-10-CM

## 2023-09-25 DIAGNOSIS — H25.13: ICD-10-CM

## 2023-09-25 DIAGNOSIS — H01.004: ICD-10-CM

## 2023-09-25 DIAGNOSIS — H25.12: ICD-10-CM

## 2023-09-25 DIAGNOSIS — H16.223: ICD-10-CM

## 2023-09-25 DIAGNOSIS — C44.1122: ICD-10-CM

## 2023-09-25 DIAGNOSIS — H01.001: ICD-10-CM

## 2023-09-25 PROCEDURE — 92250 FUNDUS PHOTOGRAPHY W/I&R: CPT | Performed by: OPHTHALMOLOGY

## 2023-09-25 PROCEDURE — 99214 OFFICE O/P EST MOD 30 MIN: CPT | Performed by: OPHTHALMOLOGY

## 2023-09-25 ASSESSMENT — TONOMETRY
OD_IOP_MMHG: 17
OS_IOP_MMHG: 19

## 2023-09-25 ASSESSMENT — LID EXAM ASSESSMENTS
OS_BLEPHARITIS: LUL
OD_TRICHIASIS: ABSENT
OD_BLEPHARITIS: RUL

## 2023-09-25 ASSESSMENT — CONFRONTATIONAL VISUAL FIELD TEST (CVF)
OD_FINDINGS: FULL
OS_FINDINGS: FULL

## 2023-09-26 ASSESSMENT — REFRACTION_AUTOREFRACTION
OS_CYLINDER: -0.75
OD_SPHERE: -3.00
OD_AXIS: 68
OD_CYLINDER: -0.50
OS_SPHERE: -0.75
OS_AXIS: 87

## 2023-09-26 ASSESSMENT — VISUAL ACUITY
OS_BCVA: 20/800
OD_BCVA: 20/25-2

## 2023-09-26 ASSESSMENT — KERATOMETRY
OD_K2POWER_DIOPTERS: 44.00
OD_AXISANGLE_DEGREES: 175
OS_AXISANGLE_DEGREES: 07
OS_K1POWER_DIOPTERS: 43.00
OS_K2POWER_DIOPTERS: 44.00
OD_K1POWER_DIOPTERS: 43.00

## 2023-09-26 ASSESSMENT — REFRACTION_MANIFEST
OD_CYLINDER: -0.50
OS_AXIS: 90
OD_SPHERE: -3.00
OD_VA1: 20/30+2
OS_VA1: 20/25-2
OS_SPHERE: -0.75
OS_CYLINDER: -0.75
OD_AXIS: 70

## 2023-09-26 ASSESSMENT — SPHEQUIV_DERIVED
OS_SPHEQUIV: -1.125
OD_SPHEQUIV: -3.25
OS_SPHEQUIV: -1.125
OD_SPHEQUIV: -3.25

## 2023-09-26 ASSESSMENT — REFRACTION_CURRENTRX
OD_VPRISM_DIRECTION: SV
OD_ADD: +2.25
OS_OVR_VA: 20/
OS_VPRISM_DIRECTION: SV
OD_AXIS: 0
OS_AXIS: 96
OD_SPHERE: -1.00
OS_ADD: +1.75
OD_OVR_VA: 20/
OS_SPHERE: -0.50
OD_CYLINDER: SPH
OS_CYLINDER: -0.50

## 2023-09-26 ASSESSMENT — AXIALLENGTH_DERIVED
OD_AL: 24.9299
OS_AL: 24.0385
OD_AL: 24.9299
OS_AL: 24.0385

## 2023-10-02 ENCOUNTER — OFFICE (OUTPATIENT)
Dept: URBAN - METROPOLITAN AREA CLINIC 12 | Facility: CLINIC | Age: 78
Setting detail: OPHTHALMOLOGY
End: 2023-10-02
Payer: MEDICARE

## 2023-10-02 DIAGNOSIS — H25.13: ICD-10-CM

## 2023-10-02 DIAGNOSIS — H25.11: ICD-10-CM

## 2023-10-02 PROBLEM — H35.3131 AGE RELATED MACULAR DEGENERATION DRY; BOTH EYES EARLY: Status: ACTIVE | Noted: 2023-09-25

## 2023-10-02 PROBLEM — H25.12 CATARACT SENILE NUCLEAR SCLEROSIS; RIGHT EYE, LEFT EYE, BOTH EYES: Status: ACTIVE | Noted: 2023-09-25

## 2023-10-02 PROCEDURE — 99213 OFFICE O/P EST LOW 20 MIN: CPT | Performed by: OPHTHALMOLOGY

## 2023-10-02 PROCEDURE — 92136 OPHTHALMIC BIOMETRY: CPT | Mod: RT | Performed by: OPHTHALMOLOGY

## 2023-10-02 ASSESSMENT — AXIALLENGTH_DERIVED
OS_AL: 24.1342
OD_AL: 25.0847
OS_AL: 24.1342
OD_AL: 25.0847

## 2023-10-02 ASSESSMENT — REFRACTION_CURRENTRX
OS_AXIS: 57
OD_SPHERE: -3.25
OS_CYLINDER: -0.50
OD_VPRISM_DIRECTION: SV
OD_OVR_VA: 20/
OD_AXIS: 73
OS_SPHERE: -0.75
OS_VPRISM_DIRECTION: SV
OD_CYLINDER: -0.75
OS_OVR_VA: 20/

## 2023-10-02 ASSESSMENT — REFRACTION_MANIFEST
OS_VA1: 20/25-2
OD_VA1: 20/30+2
OS_SPHERE: -0.75
OS_CYLINDER: -0.75
OD_AXIS: 70
OS_AXIS: 90
OD_CYLINDER: -0.50
OD_SPHERE: -3.00

## 2023-10-02 ASSESSMENT — LID EXAM ASSESSMENTS
OD_TRICHIASIS: ABSENT
OD_BLEPHARITIS: RUL
OS_BLEPHARITIS: LUL

## 2023-10-02 ASSESSMENT — TONOMETRY
OD_IOP_MMHG: 16
OS_IOP_MMHG: 17

## 2023-10-02 ASSESSMENT — KERATOMETRY
OD_AXISANGLE_DEGREES: 179
OD_K2POWER_DIOPTERS: 44.00
OS_K1POWER_DIOPTERS: 42.50
OS_K2POWER_DIOPTERS: 44.00
OS_AXISANGLE_DEGREES: 1
OD_K1POWER_DIOPTERS: 42.25

## 2023-10-02 ASSESSMENT — REFRACTION_AUTOREFRACTION
OD_SPHERE: -3.00
OD_AXIS: 88
OS_AXIS: 84
OD_CYLINDER: -0.50
OS_CYLINDER: -1.25
OS_SPHERE: -0.50

## 2023-10-02 ASSESSMENT — SPHEQUIV_DERIVED
OS_SPHEQUIV: -1.125
OD_SPHEQUIV: -3.25
OS_SPHEQUIV: -1.125
OD_SPHEQUIV: -3.25

## 2023-10-02 ASSESSMENT — CONFRONTATIONAL VISUAL FIELD TEST (CVF)
OS_FINDINGS: FULL
OD_FINDINGS: FULL

## 2023-10-02 ASSESSMENT — VISUAL ACUITY
OS_BCVA: 20/50-2
OD_BCVA: 20/25-2

## 2023-10-10 ENCOUNTER — ASC (OUTPATIENT)
Dept: URBAN - METROPOLITAN AREA SURGERY 8 | Facility: SURGERY | Age: 78
Setting detail: OPHTHALMOLOGY
End: 2023-10-10
Payer: MEDICARE

## 2023-10-10 DIAGNOSIS — H25.11: ICD-10-CM

## 2023-10-10 DIAGNOSIS — H52.211: ICD-10-CM

## 2023-10-10 PROCEDURE — 66984 XCAPSL CTRC RMVL W/O ECP: CPT | Mod: RT | Performed by: OPHTHALMOLOGY

## 2023-10-10 PROCEDURE — FEMTO FEMTOSECOND LASER: Mod: GY | Performed by: OPHTHALMOLOGY

## 2023-10-11 ENCOUNTER — RX ONLY (RX ONLY)
Age: 78
End: 2023-10-11

## 2023-10-11 ENCOUNTER — OFFICE (OUTPATIENT)
Dept: URBAN - METROPOLITAN AREA CLINIC 12 | Facility: CLINIC | Age: 78
Setting detail: OPHTHALMOLOGY
End: 2023-10-11
Payer: MEDICARE

## 2023-10-11 DIAGNOSIS — Z96.1: ICD-10-CM

## 2023-10-11 PROCEDURE — 99024 POSTOP FOLLOW-UP VISIT: CPT | Performed by: OPTOMETRIST

## 2023-10-11 ASSESSMENT — KERATOMETRY
OS_K1POWER_DIOPTERS: 43.00
OD_K1POWER_DIOPTERS: 43.00
OD_AXISANGLE_DEGREES: 097
OD_K2POWER_DIOPTERS: 44.50
OS_AXISANGLE_DEGREES: 014
OS_K2POWER_DIOPTERS: 44.00

## 2023-10-11 ASSESSMENT — REFRACTION_MANIFEST
OS_AXIS: 90
OS_VA1: 20/25-2
OS_SPHERE: -0.75
OD_AXIS: 70
OS_CYLINDER: -0.75
OD_SPHERE: -3.00
OD_VA1: 20/30+2
OD_CYLINDER: -0.50

## 2023-10-11 ASSESSMENT — CONFRONTATIONAL VISUAL FIELD TEST (CVF)
OS_FINDINGS: FULL
OD_FINDINGS: FULL

## 2023-10-11 ASSESSMENT — AXIALLENGTH_DERIVED
OD_AL: 23.4042
OD_AL: 24.8278
OS_AL: 24.0385
OS_AL: 24.0385

## 2023-10-11 ASSESSMENT — REFRACTION_CURRENTRX
OS_AXIS: 57
OS_CYLINDER: -0.50
OS_OVR_VA: 20/
OD_AXIS: 73
OD_CYLINDER: -0.75
OD_OVR_VA: 20/
OD_SPHERE: -3.25
OS_SPHERE: -0.75
OS_VPRISM_DIRECTION: SV
OD_VPRISM_DIRECTION: SV

## 2023-10-11 ASSESSMENT — LID EXAM ASSESSMENTS
OS_BLEPHARITIS: LUL
OD_BLEPHARITIS: RUL
OD_TRICHIASIS: ABSENT

## 2023-10-11 ASSESSMENT — REFRACTION_AUTOREFRACTION
OS_CYLINDER: -0.75
OD_SPHERE: +1.00
OS_AXIS: 084
OS_SPHERE: -0.75
OD_CYLINDER: -1.50
OD_AXIS: 013

## 2023-10-11 ASSESSMENT — SPHEQUIV_DERIVED
OS_SPHEQUIV: -1.125
OS_SPHEQUIV: -1.125
OD_SPHEQUIV: -3.25
OD_SPHEQUIV: 0.25

## 2023-10-11 ASSESSMENT — VISUAL ACUITY
OD_BCVA: 20/60-2
OS_BCVA: 20/30+2

## 2023-10-16 ENCOUNTER — OFFICE (OUTPATIENT)
Dept: URBAN - METROPOLITAN AREA CLINIC 12 | Facility: CLINIC | Age: 78
Setting detail: OPHTHALMOLOGY
End: 2023-10-16
Payer: MEDICARE

## 2023-10-16 DIAGNOSIS — H25.12: ICD-10-CM

## 2023-10-16 PROCEDURE — 92136 OPHTHALMIC BIOMETRY: CPT | Mod: LT | Performed by: OPHTHALMOLOGY

## 2023-10-16 ASSESSMENT — REFRACTION_MANIFEST
OS_CYLINDER: -0.75
OS_AXIS: 90
OD_VA1: 20/30+2
OD_CYLINDER: -0.50
OD_SPHERE: -3.00
OS_VA1: 20/25-2
OS_SPHERE: -0.75
OD_AXIS: 70

## 2023-10-16 ASSESSMENT — KERATOMETRY
OD_K2POWER_DIOPTERS: 44.00
OS_K2POWER_DIOPTERS: 43.75
OD_AXISANGLE_DEGREES: 104
OD_K1POWER_DIOPTERS: 43.00
OS_K1POWER_DIOPTERS: 43.00
OS_AXISANGLE_DEGREES: 010

## 2023-10-16 ASSESSMENT — VISUAL ACUITY
OS_BCVA: 20/25+2
OD_BCVA: 20/60+

## 2023-10-16 ASSESSMENT — REFRACTION_AUTOREFRACTION
OD_SPHERE: +0.75
OD_CYLINDER: -0.75
OD_AXIS: 021
OS_SPHERE: -0.50
OS_CYLINDER: -1.00
OS_AXIS: 083

## 2023-10-16 ASSESSMENT — SPHEQUIV_DERIVED
OD_SPHEQUIV: 0.375
OS_SPHEQUIV: -1
OS_SPHEQUIV: -1.125
OD_SPHEQUIV: -3.25

## 2023-10-16 ASSESSMENT — CONFRONTATIONAL VISUAL FIELD TEST (CVF)
OS_FINDINGS: FULL
OD_FINDINGS: FULL

## 2023-10-16 ASSESSMENT — REFRACTION_CURRENTRX
OS_SPHERE: -0.75
OD_SPHERE: -3.25
OD_OVR_VA: 20/
OS_CYLINDER: -0.50
OS_OVR_VA: 20/
OD_AXIS: 73
OS_AXIS: 57
OD_CYLINDER: -0.75
OD_VPRISM_DIRECTION: SV
OS_VPRISM_DIRECTION: SV

## 2023-10-16 ASSESSMENT — AXIALLENGTH_DERIVED
OD_AL: 23.4467
OD_AL: 24.9299
OS_AL: 24.0356
OS_AL: 24.0863

## 2023-10-16 ASSESSMENT — LID EXAM ASSESSMENTS
OD_TRICHIASIS: ABSENT
OS_BLEPHARITIS: LUL
OD_BLEPHARITIS: RUL

## 2023-10-16 ASSESSMENT — TONOMETRY
OD_IOP_MMHG: 19
OS_IOP_MMHG: 16

## 2023-10-24 ENCOUNTER — ASC (OUTPATIENT)
Dept: URBAN - METROPOLITAN AREA SURGERY 8 | Facility: SURGERY | Age: 78
Setting detail: OPHTHALMOLOGY
End: 2023-10-24
Payer: MEDICARE

## 2023-10-24 DIAGNOSIS — H25.12: ICD-10-CM

## 2023-10-24 PROCEDURE — 66984 XCAPSL CTRC RMVL W/O ECP: CPT | Mod: 79,LT | Performed by: OPHTHALMOLOGY

## 2023-10-25 ENCOUNTER — RX ONLY (RX ONLY)
Age: 78
End: 2023-10-25

## 2023-10-25 ENCOUNTER — OFFICE (OUTPATIENT)
Dept: URBAN - METROPOLITAN AREA CLINIC 12 | Facility: CLINIC | Age: 78
Setting detail: OPHTHALMOLOGY
End: 2023-10-25
Payer: MEDICARE

## 2023-10-25 DIAGNOSIS — Z96.1: ICD-10-CM

## 2023-10-25 PROCEDURE — 99024 POSTOP FOLLOW-UP VISIT: CPT | Performed by: OPTOMETRIST

## 2023-10-25 ASSESSMENT — REFRACTION_CURRENTRX
OD_SPHERE: -3.25
OS_OVR_VA: 20/
OD_AXIS: 73
OD_CYLINDER: -0.75
OS_CYLINDER: -0.50
OS_SPHERE: -0.75
OS_AXIS: 57
OD_OVR_VA: 20/
OS_VPRISM_DIRECTION: SV
OD_VPRISM_DIRECTION: SV

## 2023-10-25 ASSESSMENT — KERATOMETRY
OS_K2POWER_DIOPTERS: 43.50
OS_AXISANGLE_DEGREES: 007
OS_K1POWER_DIOPTERS: 42.75
OD_K2POWER_DIOPTERS: 44.00
OD_K1POWER_DIOPTERS: 43.00
OD_AXISANGLE_DEGREES: 098

## 2023-10-25 ASSESSMENT — LID EXAM ASSESSMENTS
OS_BLEPHARITIS: LUL
OD_TRICHIASIS: ABSENT
OD_BLEPHARITIS: RUL

## 2023-10-25 ASSESSMENT — AXIALLENGTH_DERIVED
OD_AL: 23.5433
OS_AL: 23.6813
OS_AL: 24.1824
OD_AL: 24.9299

## 2023-10-25 ASSESSMENT — VISUAL ACUITY
OD_BCVA: 20/30-1
OS_BCVA: 20/20-1

## 2023-10-25 ASSESSMENT — REFRACTION_MANIFEST
OS_AXIS: 90
OS_VA1: 20/25-2
OD_SPHERE: -3.00
OS_SPHERE: -0.75
OS_CYLINDER: -0.75
OD_CYLINDER: -0.50
OD_AXIS: 70
OD_VA1: 20/30+2

## 2023-10-25 ASSESSMENT — CONFRONTATIONAL VISUAL FIELD TEST (CVF)
OD_FINDINGS: FULL
OS_FINDINGS: FULL

## 2023-10-25 ASSESSMENT — SPHEQUIV_DERIVED
OD_SPHEQUIV: 0.125
OS_SPHEQUIV: 0.125
OS_SPHEQUIV: -1.125
OD_SPHEQUIV: -3.25

## 2023-10-25 ASSESSMENT — TONOMETRY
OD_IOP_MMHG: 14
OS_IOP_MMHG: 18

## 2023-10-25 ASSESSMENT — REFRACTION_AUTOREFRACTION
OS_AXIS: 092
OS_SPHERE: +0.75
OD_CYLINDER: -0.75
OS_CYLINDER: -1.25
OD_AXIS: 012
OD_SPHERE: +0.50

## 2023-10-31 ENCOUNTER — APPOINTMENT (OUTPATIENT)
Dept: ORTHOPEDIC SURGERY | Facility: CLINIC | Age: 78
End: 2023-10-31
Payer: MEDICARE

## 2023-10-31 VITALS — BODY MASS INDEX: 31.5 KG/M2 | WEIGHT: 146 LBS | HEIGHT: 57 IN

## 2023-10-31 DIAGNOSIS — Z78.9 OTHER SPECIFIED HEALTH STATUS: ICD-10-CM

## 2023-10-31 DIAGNOSIS — M17.11 UNILATERAL PRIMARY OSTEOARTHRITIS, RIGHT KNEE: ICD-10-CM

## 2023-10-31 DIAGNOSIS — Z86.79 PERSONAL HISTORY OF OTHER DISEASES OF THE CIRCULATORY SYSTEM: ICD-10-CM

## 2023-10-31 DIAGNOSIS — Z87.19 PERSONAL HISTORY OF OTHER DISEASES OF THE DIGESTIVE SYSTEM: ICD-10-CM

## 2023-10-31 PROCEDURE — 99215 OFFICE O/P EST HI 40 MIN: CPT

## 2023-11-01 ENCOUNTER — APPOINTMENT (OUTPATIENT)
Dept: CT IMAGING | Facility: CLINIC | Age: 78
End: 2023-11-01
Payer: MEDICARE

## 2023-11-01 PROCEDURE — 76376 3D RENDER W/INTRP POSTPROCES: CPT | Mod: RT

## 2023-11-01 PROCEDURE — 73700 CT LOWER EXTREMITY W/O DYE: CPT | Mod: RT

## 2023-11-14 ENCOUNTER — APPOINTMENT (OUTPATIENT)
Dept: ORTHOPEDIC SURGERY | Facility: CLINIC | Age: 78
End: 2023-11-14
Payer: MEDICARE

## 2023-11-14 PROCEDURE — 73564 X-RAY EXAM KNEE 4 OR MORE: CPT | Mod: RT

## 2023-11-27 ENCOUNTER — OUTPATIENT (OUTPATIENT)
Dept: OUTPATIENT SERVICES | Facility: HOSPITAL | Age: 78
LOS: 1 days | End: 2023-11-27
Payer: MEDICARE

## 2023-11-27 VITALS
DIASTOLIC BLOOD PRESSURE: 89 MMHG | RESPIRATION RATE: 14 BRPM | OXYGEN SATURATION: 96 % | HEART RATE: 76 BPM | WEIGHT: 145.95 LBS | SYSTOLIC BLOOD PRESSURE: 168 MMHG | HEIGHT: 56.75 IN | TEMPERATURE: 98 F

## 2023-11-27 DIAGNOSIS — M17.11 UNILATERAL PRIMARY OSTEOARTHRITIS, RIGHT KNEE: ICD-10-CM

## 2023-11-27 DIAGNOSIS — Z98.890 OTHER SPECIFIED POSTPROCEDURAL STATES: Chronic | ICD-10-CM

## 2023-11-27 DIAGNOSIS — Z98.891 HISTORY OF UTERINE SCAR FROM PREVIOUS SURGERY: Chronic | ICD-10-CM

## 2023-11-27 DIAGNOSIS — Z90.89 ACQUIRED ABSENCE OF OTHER ORGANS: Chronic | ICD-10-CM

## 2023-11-27 DIAGNOSIS — Z01.818 ENCOUNTER FOR OTHER PREPROCEDURAL EXAMINATION: ICD-10-CM

## 2023-11-27 DIAGNOSIS — Z98.41 CATARACT EXTRACTION STATUS, RIGHT EYE: Chronic | ICD-10-CM

## 2023-11-27 DIAGNOSIS — Z98.42 CATARACT EXTRACTION STATUS, LEFT EYE: Chronic | ICD-10-CM

## 2023-11-27 LAB
A1C WITH ESTIMATED AVERAGE GLUCOSE RESULT: 5.8 % — HIGH (ref 4–5.6)
A1C WITH ESTIMATED AVERAGE GLUCOSE RESULT: 5.8 % — HIGH (ref 4–5.6)
ALBUMIN SERPL ELPH-MCNC: 4 G/DL — SIGNIFICANT CHANGE UP (ref 3.3–5)
ALBUMIN SERPL ELPH-MCNC: 4 G/DL — SIGNIFICANT CHANGE UP (ref 3.3–5)
ALP SERPL-CCNC: 64 U/L — SIGNIFICANT CHANGE UP (ref 30–120)
ALP SERPL-CCNC: 64 U/L — SIGNIFICANT CHANGE UP (ref 30–120)
ALT FLD-CCNC: 30 U/L — SIGNIFICANT CHANGE UP (ref 10–60)
ALT FLD-CCNC: 30 U/L — SIGNIFICANT CHANGE UP (ref 10–60)
ANION GAP SERPL CALC-SCNC: 9 MMOL/L — SIGNIFICANT CHANGE UP (ref 5–17)
ANION GAP SERPL CALC-SCNC: 9 MMOL/L — SIGNIFICANT CHANGE UP (ref 5–17)
APTT BLD: 32 SEC — SIGNIFICANT CHANGE UP (ref 24.5–35.6)
APTT BLD: 32 SEC — SIGNIFICANT CHANGE UP (ref 24.5–35.6)
AST SERPL-CCNC: 26 U/L — SIGNIFICANT CHANGE UP (ref 10–40)
AST SERPL-CCNC: 26 U/L — SIGNIFICANT CHANGE UP (ref 10–40)
BILIRUB SERPL-MCNC: 1.1 MG/DL — SIGNIFICANT CHANGE UP (ref 0.2–1.2)
BILIRUB SERPL-MCNC: 1.1 MG/DL — SIGNIFICANT CHANGE UP (ref 0.2–1.2)
BLD GP AB SCN SERPL QL: SIGNIFICANT CHANGE UP
BLD GP AB SCN SERPL QL: SIGNIFICANT CHANGE UP
BUN SERPL-MCNC: 17 MG/DL — SIGNIFICANT CHANGE UP (ref 7–23)
BUN SERPL-MCNC: 17 MG/DL — SIGNIFICANT CHANGE UP (ref 7–23)
CALCIUM SERPL-MCNC: 9.4 MG/DL — SIGNIFICANT CHANGE UP (ref 8.4–10.5)
CALCIUM SERPL-MCNC: 9.4 MG/DL — SIGNIFICANT CHANGE UP (ref 8.4–10.5)
CHLORIDE SERPL-SCNC: 104 MMOL/L — SIGNIFICANT CHANGE UP (ref 96–108)
CHLORIDE SERPL-SCNC: 104 MMOL/L — SIGNIFICANT CHANGE UP (ref 96–108)
CO2 SERPL-SCNC: 27 MMOL/L — SIGNIFICANT CHANGE UP (ref 22–31)
CO2 SERPL-SCNC: 27 MMOL/L — SIGNIFICANT CHANGE UP (ref 22–31)
CREAT SERPL-MCNC: 1.14 MG/DL — SIGNIFICANT CHANGE UP (ref 0.5–1.3)
CREAT SERPL-MCNC: 1.14 MG/DL — SIGNIFICANT CHANGE UP (ref 0.5–1.3)
EGFR: 50 ML/MIN/1.73M2 — LOW
EGFR: 50 ML/MIN/1.73M2 — LOW
ESTIMATED AVERAGE GLUCOSE: 120 MG/DL — HIGH (ref 68–114)
ESTIMATED AVERAGE GLUCOSE: 120 MG/DL — HIGH (ref 68–114)
GLUCOSE SERPL-MCNC: 111 MG/DL — HIGH (ref 70–99)
GLUCOSE SERPL-MCNC: 111 MG/DL — HIGH (ref 70–99)
HCT VFR BLD CALC: 40.3 % — SIGNIFICANT CHANGE UP (ref 34.5–45)
HCT VFR BLD CALC: 40.3 % — SIGNIFICANT CHANGE UP (ref 34.5–45)
HGB BLD-MCNC: 13.2 G/DL — SIGNIFICANT CHANGE UP (ref 11.5–15.5)
HGB BLD-MCNC: 13.2 G/DL — SIGNIFICANT CHANGE UP (ref 11.5–15.5)
INR BLD: 0.99 RATIO — SIGNIFICANT CHANGE UP (ref 0.85–1.18)
INR BLD: 0.99 RATIO — SIGNIFICANT CHANGE UP (ref 0.85–1.18)
MCHC RBC-ENTMCNC: 29.3 PG — SIGNIFICANT CHANGE UP (ref 27–34)
MCHC RBC-ENTMCNC: 29.3 PG — SIGNIFICANT CHANGE UP (ref 27–34)
MCHC RBC-ENTMCNC: 32.8 GM/DL — SIGNIFICANT CHANGE UP (ref 32–36)
MCHC RBC-ENTMCNC: 32.8 GM/DL — SIGNIFICANT CHANGE UP (ref 32–36)
MCV RBC AUTO: 89.4 FL — SIGNIFICANT CHANGE UP (ref 80–100)
MCV RBC AUTO: 89.4 FL — SIGNIFICANT CHANGE UP (ref 80–100)
MRSA PCR RESULT.: SIGNIFICANT CHANGE UP
MRSA PCR RESULT.: SIGNIFICANT CHANGE UP
NRBC # BLD: 0 /100 WBCS — SIGNIFICANT CHANGE UP (ref 0–0)
NRBC # BLD: 0 /100 WBCS — SIGNIFICANT CHANGE UP (ref 0–0)
PLATELET # BLD AUTO: 267 K/UL — SIGNIFICANT CHANGE UP (ref 150–400)
PLATELET # BLD AUTO: 267 K/UL — SIGNIFICANT CHANGE UP (ref 150–400)
POTASSIUM SERPL-MCNC: 4.5 MMOL/L — SIGNIFICANT CHANGE UP (ref 3.5–5.3)
POTASSIUM SERPL-MCNC: 4.5 MMOL/L — SIGNIFICANT CHANGE UP (ref 3.5–5.3)
POTASSIUM SERPL-SCNC: 4.5 MMOL/L — SIGNIFICANT CHANGE UP (ref 3.5–5.3)
POTASSIUM SERPL-SCNC: 4.5 MMOL/L — SIGNIFICANT CHANGE UP (ref 3.5–5.3)
PROT SERPL-MCNC: 8 G/DL — SIGNIFICANT CHANGE UP (ref 6–8.3)
PROT SERPL-MCNC: 8 G/DL — SIGNIFICANT CHANGE UP (ref 6–8.3)
PROTHROM AB SERPL-ACNC: 10.8 SEC — SIGNIFICANT CHANGE UP (ref 9.5–13)
PROTHROM AB SERPL-ACNC: 10.8 SEC — SIGNIFICANT CHANGE UP (ref 9.5–13)
RBC # BLD: 4.51 M/UL — SIGNIFICANT CHANGE UP (ref 3.8–5.2)
RBC # BLD: 4.51 M/UL — SIGNIFICANT CHANGE UP (ref 3.8–5.2)
RBC # FLD: 13.2 % — SIGNIFICANT CHANGE UP (ref 10.3–14.5)
RBC # FLD: 13.2 % — SIGNIFICANT CHANGE UP (ref 10.3–14.5)
S AUREUS DNA NOSE QL NAA+PROBE: DETECTED
S AUREUS DNA NOSE QL NAA+PROBE: DETECTED
SARS-COV-2 RNA SPEC QL NAA+PROBE: SIGNIFICANT CHANGE UP
SARS-COV-2 RNA SPEC QL NAA+PROBE: SIGNIFICANT CHANGE UP
SODIUM SERPL-SCNC: 140 MMOL/L — SIGNIFICANT CHANGE UP (ref 135–145)
SODIUM SERPL-SCNC: 140 MMOL/L — SIGNIFICANT CHANGE UP (ref 135–145)
WBC # BLD: 4.33 K/UL — SIGNIFICANT CHANGE UP (ref 3.8–10.5)
WBC # BLD: 4.33 K/UL — SIGNIFICANT CHANGE UP (ref 3.8–10.5)
WBC # FLD AUTO: 4.33 K/UL — SIGNIFICANT CHANGE UP (ref 3.8–10.5)
WBC # FLD AUTO: 4.33 K/UL — SIGNIFICANT CHANGE UP (ref 3.8–10.5)

## 2023-11-27 PROCEDURE — 93005 ELECTROCARDIOGRAM TRACING: CPT

## 2023-11-27 PROCEDURE — G0463: CPT

## 2023-11-27 PROCEDURE — 93010 ELECTROCARDIOGRAM REPORT: CPT

## 2023-11-27 RX ORDER — METOPROLOL TARTRATE 50 MG
1 TABLET ORAL
Qty: 0 | Refills: 0 | DISCHARGE

## 2023-11-27 RX ORDER — LEVOTHYROXINE SODIUM 125 MCG
1 TABLET ORAL
Qty: 0 | Refills: 0 | DISCHARGE

## 2023-11-27 RX ORDER — ACETAMINOPHEN 500 MG
2 TABLET ORAL
Qty: 0 | Refills: 0 | DISCHARGE

## 2023-11-27 NOTE — H&P PST ADULT - NSICDXPASTSURGICALHX_GEN_ALL_CORE_FT
PAST SURGICAL HISTORY:  History of 2  sections     History of left cataract extraction     History of repair of left rotator cuff     History of repair of right rotator cuff     History of right cataract extraction     S/P tonsillectomy     Status post Mohs surgery for basal cell carcinoma

## 2023-11-27 NOTE — H&P PST ADULT - PROBLEM SELECTOR PLAN 1
right TKR. medical clearance requested. gatorade and surgical wash instructions reviewed and verbalized understanding. instructed to stop MVI and hair, skin and nails 1 week prior to surgery and instructed to take metoprolol and levothyroxine AM of surgery with sips of water. covid PCR done today since patient had direct exposure and has a mild cough and runny nose

## 2023-11-27 NOTE — H&P PST ADULT - MUSCULOSKELETAL
right knee/no joint swelling/no joint erythema/no joint warmth/no calf tenderness/decreased ROM/decreased ROM due to pain/strength 5/5 bilateral upper extremities/decreased strength details…

## 2023-11-27 NOTE — H&P PST ADULT - NSICDXFAMILYHX_GEN_ALL_CORE_FT
FAMILY HISTORY:  Father  Still living? No  Family history of hypertension, Age at diagnosis: Age Unknown    Mother  Still living? No  Family history of hypertension, Age at diagnosis: Age Unknown    Sibling  Still living? No  Family history of lung cancer, Age at diagnosis: Age Unknown  Family history of breast cancer, Age at diagnosis: Age Unknown  Family history of cervical cancer, Age at diagnosis: Age Unknown  Family history of sleep apnea, Age at diagnosis: Age Unknown  Family history of dementia, Age at diagnosis: Age Unknown    Child  Still living? Yes, Estimated age: 41-50  Family history of sleep apnea, Age at diagnosis: Age Unknown

## 2023-11-27 NOTE — H&P PST ADULT - NSANTHOSAYNRD_GEN_A_CORE
neck 14 inches/No. MORRIS screening performed.  STOP BANG Legend: 0-2 = LOW Risk; 3-4 = INTERMEDIATE Risk; 5-8 = HIGH Risk

## 2023-11-27 NOTE — H&P PST ADULT - NSICDXPASTMEDICALHX_GEN_ALL_CORE_FT
PAST MEDICAL HISTORY:  COVID-19 vaccine series completed     GERD (gastroesophageal reflux disease)     History of basal cell cancer     History of COVID-19     History of migraine     HTN (hypertension)     Increased urinary frequency     OAB (overactive bladder)     Obesity (BMI 30.0-34.9)     Osteoarthritis     Osteopenia     Primary osteoarthritis of right knee     Thyroid disease     Urge urinary incontinence     Varicose veins

## 2023-11-27 NOTE — H&P PST ADULT - COMMENTS
history of covid April 2020, mild symptoms and no treatment.  denies any current cold or flu like symptoms, including fever, cough, sinus congestion, body aches or chills but had exposure to covid 11/24/23. While in the exam room, runny nose and cough detected

## 2023-11-27 NOTE — H&P PST ADULT - HISTORY OF PRESENT ILLNESS
79 yo female presents with 3 year history of right knee pain. Denies injury of prior surgery. She received "gel shots" with no relief and cortisone injections with good relief until the last one in August 2023. Also doing PT twice a week with improved strength but no change in pain. Pain now 0-2/10 at rest and increased to 8-10/10 with activity. Denies using any pain relief measures other than rest. 77 yo female presents with 3 year history of right knee pain. Denies injury of prior surgery. She received "gel shots" with no relief and cortisone injections with good relief until the last one in August 2023. Also doing PT twice a week with improved strength but no change in pain. Pain now 0-2/10 at rest and increased to 8-10/10 with activity. Denies using any pain relief measures other than rest.

## 2023-11-28 RX ORDER — MUPIROCIN 20 MG/G
1 OINTMENT TOPICAL
Qty: 1 | Refills: 0
Start: 2023-11-28 | End: 2023-12-02

## 2023-11-28 NOTE — PROGRESS NOTE ADULT - SUBJECTIVE AND OBJECTIVE BOX
Nasal culture PCR result: staph aureus detected  Topical mupirocin escribed  left message for patient to call back  Nasal culture PCR result: staph aureus detected  Topical mupirocin escribed  left message for patient to call back   addendum: patient called back; understands treatment

## 2023-12-10 ENCOUNTER — TRANSCRIPTION ENCOUNTER (OUTPATIENT)
Age: 78
End: 2023-12-10

## 2023-12-11 ENCOUNTER — APPOINTMENT (OUTPATIENT)
Dept: ORTHOPEDIC SURGERY | Facility: HOSPITAL | Age: 78
End: 2023-12-11
Payer: MEDICARE

## 2023-12-11 ENCOUNTER — TRANSCRIPTION ENCOUNTER (OUTPATIENT)
Age: 78
End: 2023-12-11

## 2023-12-11 ENCOUNTER — INPATIENT (INPATIENT)
Facility: HOSPITAL | Age: 78
LOS: 0 days | Discharge: HOME | DRG: 470 | End: 2023-12-12
Attending: ORTHOPAEDIC SURGERY | Admitting: ORTHOPAEDIC SURGERY
Payer: MEDICARE

## 2023-12-11 VITALS — OXYGEN SATURATION: 100 %

## 2023-12-11 DIAGNOSIS — Z98.890 OTHER SPECIFIED POSTPROCEDURAL STATES: Chronic | ICD-10-CM

## 2023-12-11 DIAGNOSIS — Z98.41 CATARACT EXTRACTION STATUS, RIGHT EYE: Chronic | ICD-10-CM

## 2023-12-11 DIAGNOSIS — M17.11 UNILATERAL PRIMARY OSTEOARTHRITIS, RIGHT KNEE: ICD-10-CM

## 2023-12-11 DIAGNOSIS — Z90.89 ACQUIRED ABSENCE OF OTHER ORGANS: Chronic | ICD-10-CM

## 2023-12-11 DIAGNOSIS — Z98.891 HISTORY OF UTERINE SCAR FROM PREVIOUS SURGERY: Chronic | ICD-10-CM

## 2023-12-11 DIAGNOSIS — Z98.42 CATARACT EXTRACTION STATUS, LEFT EYE: Chronic | ICD-10-CM

## 2023-12-11 PROCEDURE — 73560 X-RAY EXAM OF KNEE 1 OR 2: CPT | Mod: 26,RT

## 2023-12-11 PROCEDURE — 27447 TOTAL KNEE ARTHROPLASTY: CPT | Mod: AS,RT

## 2023-12-11 PROCEDURE — 27447 TOTAL KNEE ARTHROPLASTY: CPT | Mod: RT

## 2023-12-11 PROCEDURE — 20985 CPTR-ASST DIR MS PX: CPT

## 2023-12-11 PROCEDURE — 99222 1ST HOSP IP/OBS MODERATE 55: CPT

## 2023-12-11 DEVICE — MAKO BONE PIN 4MM X 140MM: Type: IMPLANTABLE DEVICE | Status: FUNCTIONAL

## 2023-12-11 DEVICE — PATELLA ASYMM TRIATHLON SZ A 32X10MM: Type: IMPLANTABLE DEVICE | Status: FUNCTIONAL

## 2023-12-11 DEVICE — TRIATHLON TIBIAL COMP SZ 3: Type: IMPLANTABLE DEVICE | Status: FUNCTIONAL

## 2023-12-11 DEVICE — INSERT TIB BEARING TRIATHLON CS X3 SZ 3 9MM: Type: IMPLANTABLE DEVICE | Status: FUNCTIONAL

## 2023-12-11 DEVICE — MAKO BONE PIN 4MM X 110MM: Type: IMPLANTABLE DEVICE | Status: FUNCTIONAL

## 2023-12-11 DEVICE — COMP FEM TRIATHLON CR SZ3 RT: Type: IMPLANTABLE DEVICE | Status: FUNCTIONAL

## 2023-12-11 RX ORDER — ACETAMINOPHEN 500 MG
1000 TABLET ORAL EVERY 8 HOURS
Refills: 0 | Status: DISCONTINUED | OUTPATIENT
Start: 2023-12-12 | End: 2023-12-12

## 2023-12-11 RX ORDER — OMEPRAZOLE 10 MG/1
1 CAPSULE, DELAYED RELEASE ORAL
Refills: 0 | DISCHARGE

## 2023-12-11 RX ORDER — CHLORHEXIDINE GLUCONATE 213 G/1000ML
1 SOLUTION TOPICAL ONCE
Refills: 0 | Status: COMPLETED | OUTPATIENT
Start: 2023-12-11 | End: 2023-12-11

## 2023-12-11 RX ORDER — OXYCODONE HYDROCHLORIDE 5 MG/1
10 TABLET ORAL
Refills: 0 | Status: DISCONTINUED | OUTPATIENT
Start: 2023-12-11 | End: 2023-12-12

## 2023-12-11 RX ORDER — DEXAMETHASONE 0.5 MG/5ML
8 ELIXIR ORAL ONCE
Refills: 0 | Status: COMPLETED | OUTPATIENT
Start: 2023-12-12 | End: 2023-12-12

## 2023-12-11 RX ORDER — METOPROLOL TARTRATE 50 MG
1 TABLET ORAL
Refills: 0 | DISCHARGE

## 2023-12-11 RX ORDER — ACETAMINOPHEN 500 MG
1000 TABLET ORAL ONCE
Refills: 0 | Status: COMPLETED | OUTPATIENT
Start: 2023-12-11 | End: 2023-12-11

## 2023-12-11 RX ORDER — POLYETHYLENE GLYCOL 3350 17 G/17G
17 POWDER, FOR SOLUTION ORAL AT BEDTIME
Refills: 0 | Status: DISCONTINUED | OUTPATIENT
Start: 2023-12-11 | End: 2023-12-12

## 2023-12-11 RX ORDER — ONDANSETRON 8 MG/1
4 TABLET, FILM COATED ORAL ONCE
Refills: 0 | Status: DISCONTINUED | OUTPATIENT
Start: 2023-12-11 | End: 2023-12-11

## 2023-12-11 RX ORDER — APREPITANT 80 MG/1
40 CAPSULE ORAL ONCE
Refills: 0 | Status: COMPLETED | OUTPATIENT
Start: 2023-12-11 | End: 2023-12-11

## 2023-12-11 RX ORDER — SODIUM CHLORIDE 9 MG/ML
1000 INJECTION, SOLUTION INTRAVENOUS
Refills: 0 | Status: DISCONTINUED | OUTPATIENT
Start: 2023-12-11 | End: 2023-12-12

## 2023-12-11 RX ORDER — OXYBUTYNIN CHLORIDE 5 MG
1 TABLET ORAL
Qty: 0 | Refills: 0 | DISCHARGE

## 2023-12-11 RX ORDER — HYDROMORPHONE HYDROCHLORIDE 2 MG/ML
0.5 INJECTION INTRAMUSCULAR; INTRAVENOUS; SUBCUTANEOUS
Refills: 0 | Status: DISCONTINUED | OUTPATIENT
Start: 2023-12-11 | End: 2023-12-12

## 2023-12-11 RX ORDER — MAGNESIUM HYDROXIDE 400 MG/1
30 TABLET, CHEWABLE ORAL DAILY
Refills: 0 | Status: DISCONTINUED | OUTPATIENT
Start: 2023-12-11 | End: 2023-12-12

## 2023-12-11 RX ORDER — LOSARTAN POTASSIUM 100 MG/1
1 TABLET, FILM COATED ORAL
Qty: 0 | Refills: 0 | DISCHARGE

## 2023-12-11 RX ORDER — TRANEXAMIC ACID 100 MG/ML
1000 INJECTION, SOLUTION INTRAVENOUS ONCE
Refills: 0 | Status: COMPLETED | OUTPATIENT
Start: 2023-12-11 | End: 2023-12-11

## 2023-12-11 RX ORDER — ASPIRIN/CALCIUM CARB/MAGNESIUM 324 MG
81 TABLET ORAL EVERY 12 HOURS
Refills: 0 | Status: DISCONTINUED | OUTPATIENT
Start: 2023-12-12 | End: 2023-12-12

## 2023-12-11 RX ORDER — OXYCODONE HYDROCHLORIDE 5 MG/1
5 TABLET ORAL
Refills: 0 | Status: DISCONTINUED | OUTPATIENT
Start: 2023-12-11 | End: 2023-12-12

## 2023-12-11 RX ORDER — ONDANSETRON 8 MG/1
4 TABLET, FILM COATED ORAL EVERY 6 HOURS
Refills: 0 | Status: DISCONTINUED | OUTPATIENT
Start: 2023-12-11 | End: 2023-12-12

## 2023-12-11 RX ORDER — PANTOPRAZOLE SODIUM 20 MG/1
40 TABLET, DELAYED RELEASE ORAL
Refills: 0 | Status: DISCONTINUED | OUTPATIENT
Start: 2023-12-11 | End: 2023-12-12

## 2023-12-11 RX ORDER — SENNA PLUS 8.6 MG/1
2 TABLET ORAL AT BEDTIME
Refills: 0 | Status: DISCONTINUED | OUTPATIENT
Start: 2023-12-11 | End: 2023-12-12

## 2023-12-11 RX ORDER — LEVOTHYROXINE SODIUM 125 MCG
1 TABLET ORAL
Refills: 0 | DISCHARGE

## 2023-12-11 RX ORDER — CEFAZOLIN SODIUM 1 G
2000 VIAL (EA) INJECTION EVERY 8 HOURS
Refills: 0 | Status: COMPLETED | OUTPATIENT
Start: 2023-12-11 | End: 2023-12-12

## 2023-12-11 RX ORDER — MELOXICAM 15 MG/1
15 TABLET ORAL ONCE
Refills: 0 | Status: COMPLETED | OUTPATIENT
Start: 2023-12-11 | End: 2023-12-11

## 2023-12-11 RX ORDER — HYDROMORPHONE HYDROCHLORIDE 2 MG/ML
0.25 INJECTION INTRAMUSCULAR; INTRAVENOUS; SUBCUTANEOUS
Refills: 0 | Status: DISCONTINUED | OUTPATIENT
Start: 2023-12-11 | End: 2023-12-11

## 2023-12-11 RX ORDER — MELOXICAM 15 MG/1
15 TABLET ORAL DAILY
Refills: 0 | Status: DISCONTINUED | OUTPATIENT
Start: 2023-12-12 | End: 2023-12-12

## 2023-12-11 RX ORDER — SODIUM CHLORIDE 9 MG/ML
1000 INJECTION, SOLUTION INTRAVENOUS
Refills: 0 | Status: DISCONTINUED | OUTPATIENT
Start: 2023-12-11 | End: 2023-12-11

## 2023-12-11 RX ORDER — ACETAMINOPHEN 500 MG
1000 TABLET ORAL EVERY 6 HOURS
Refills: 0 | Status: COMPLETED | OUTPATIENT
Start: 2023-12-11 | End: 2023-12-12

## 2023-12-11 RX ORDER — CEFAZOLIN SODIUM 1 G
2000 VIAL (EA) INJECTION ONCE
Refills: 0 | Status: COMPLETED | OUTPATIENT
Start: 2023-12-11 | End: 2023-12-11

## 2023-12-11 RX ORDER — HYDROMORPHONE HYDROCHLORIDE 2 MG/ML
0.5 INJECTION INTRAMUSCULAR; INTRAVENOUS; SUBCUTANEOUS
Refills: 0 | Status: DISCONTINUED | OUTPATIENT
Start: 2023-12-11 | End: 2023-12-11

## 2023-12-11 RX ORDER — ASCORBIC ACID 60 MG
1 TABLET,CHEWABLE ORAL
Refills: 0 | DISCHARGE

## 2023-12-11 RX ORDER — CHOLECALCIFEROL (VITAMIN D3) 125 MCG
1 CAPSULE ORAL
Refills: 0 | DISCHARGE

## 2023-12-11 RX ADMIN — MELOXICAM 15 MILLIGRAM(S): 15 TABLET ORAL at 21:38

## 2023-12-11 RX ADMIN — SODIUM CHLORIDE 125 MILLILITER(S): 9 INJECTION, SOLUTION INTRAVENOUS at 19:03

## 2023-12-11 RX ADMIN — MELOXICAM 15 MILLIGRAM(S): 15 TABLET ORAL at 22:12

## 2023-12-11 RX ADMIN — APREPITANT 40 MILLIGRAM(S): 80 CAPSULE ORAL at 09:42

## 2023-12-11 RX ADMIN — SENNA PLUS 2 TABLET(S): 8.6 TABLET ORAL at 21:38

## 2023-12-11 RX ADMIN — Medication 1000 MILLIGRAM(S): at 19:08

## 2023-12-11 RX ADMIN — Medication 400 MILLIGRAM(S): at 19:02

## 2023-12-11 RX ADMIN — OXYCODONE HYDROCHLORIDE 5 MILLIGRAM(S): 5 TABLET ORAL at 23:35

## 2023-12-11 RX ADMIN — CHLORHEXIDINE GLUCONATE 1 APPLICATION(S): 213 SOLUTION TOPICAL at 09:42

## 2023-12-11 RX ADMIN — Medication 100 MILLIGRAM(S): at 20:05

## 2023-12-11 NOTE — DISCHARGE NOTE PROVIDER - HOSPITAL COURSE
This is 78y Female patient was admitted to [facility] with a history Primary localized osteoarthritis of right knee.  Patient went to Pre-Surgical Testing at [facility] and was medically cleared to undergo elective procedure.    The patient underwent a Right total knee arthroplasty by Clif Esteves on 12-11-23.   No operative or harris-operative complications arose during patients hospital course.    Patient received antibiotic according to SCIP guidelines for infection prevention.   Aspirin was given for DVT prophylaxis.    Anesthesia, Medical Hospitalist, Physical Therapy and Occupational Therapy were consulted. Patient is stable for discharge with a good prognosis.  Appropriate discharge instructions and medications are provided in this document. This is 78y Female patient was admitted to [facility] with a history Primary localized osteoarthritis of right knee.  Patient went to Pre-Surgical Testing at [facility] and was medically cleared to undergo elective procedure.    The patient underwent a Right total knee arthroplasty by Clif Esteves on 12-11-23.   No operative or harris-operative complications arose during patients hospital course.    Patient received antibiotic according to SCIP guidelines for infection prevention.   Aspirin was given for DVT prophylaxis.    Anesthesia, Medical Hospitalist, Physical Therapy and Occupational Therapy were consulted. Patient is stable for discharge with a good prognosis.  Appropriate discharge instructions and medications are provided in this document.

## 2023-12-11 NOTE — PHYSICAL THERAPY INITIAL EVALUATION ADULT - ADDITIONAL COMMENTS
Patient lives with her spouse in a home with 5 steps to enter with railing. Has steps indoors but doesn't have to negotiate those. Patient was IND with ambulation with no assistive device and IND with ADLs. Patient has a tub at home with grab bars. Her  is able to assist if needed.

## 2023-12-11 NOTE — PATIENT PROFILE ADULT - FALL HARM RISK - UNIVERSAL INTERVENTIONS
Bed in lowest position, wheels locked, appropriate side rails in place/Call bell, personal items and telephone in reach/Instruct patient to call for assistance before getting out of bed or chair/Non-slip footwear when patient is out of bed/[place] to call system/Physically safe environment - no spills, clutter or unnecessary equipment/Purposeful Proactive Rounding/Room/bathroom lighting operational, light cord in reach Bed in lowest position, wheels locked, appropriate side rails in place/Call bell, personal items and telephone in reach/Instruct patient to call for assistance before getting out of bed or chair/Non-slip footwear when patient is out of bed/[place] to call system/Physically safe environment - no spills, clutter or unnecessary equipment/Purposeful Proactive Rounding/Room/bathroom lighting operational, light cord in reach

## 2023-12-11 NOTE — DISCHARGE NOTE PROVIDER - NSDCCPCAREPLAN_GEN_ALL_CORE_FT
PRINCIPAL DISCHARGE DIAGNOSIS  Diagnosis: Primary localized osteoarthritis of right knee  Assessment and Plan of Treatment: Physical Therapy/Occupational Therapy for: ambulation, transfers, stairs, ADL's (activities of daily living), range of motion exercises, and isometrics  -Activity  • Weight Bearing as tolerated with rolling walker.  • Take short, frequent walks increasing the distance that you walk each day as tolerated.  • Change your position every hour to decrease pain and stiffness.  • Continue the exercises taught to you by your physical therapist.  • No driving until cleared by the doctor.  • No tub baths, hot tubs, or swimming pools until instructed by your doctor.  • Do not squat down on the floor.  • Do not kneel or twist your knee.  • Range of Motion Goals: Flexion= 120 degrees, Extension = 0 degrees  Keep incision clean and dry. May shower after surgery if no drainage from incision.  You have a BORA negative pressure and water resistant dressing over your surgical wound and may shower.  Disconnect BORA battery prior to showering, reconnect battery after showering and press orange button to resume BORA power. Remove BORA dressing 7 days after surgery or when the battery alarms and stops working. If drainage is noted from your wound, apply a dry sterile dressing and call your surgeon.

## 2023-12-11 NOTE — PATIENT PROFILE ADULT - FALL HARM RISK - FALL HARM RISK
Banner Transposition Flap Text: The defect edges were debeveled with a #15 scalpel blade.  Given the location of the defect and the proximity to free margins a Banner transposition flap was deemed most appropriate.  Using a sterile surgical marker, an appropriate flap drawn around the defect. The area thus outlined was incised deep to adipose tissue with a #15 scalpel blade.  The skin margins were undermined to an appropriate distance in all directions utilizing iris scissors. Surgery

## 2023-12-11 NOTE — BRIEF OPERATIVE NOTE - NSICDXBRIEFPROCEDURE_GEN_ALL_CORE_FT
PROCEDURES:  Right total knee arthroplasty 11-Dec-2023 11:23:01  Toño Herndon   no cyanosis/no clubbing/no pedal edema

## 2023-12-11 NOTE — DISCHARGE NOTE PROVIDER - INSTRUCTIONS
Pain medicine has been prescribed for you, as needed, and it often causes constipation.  Take docusate sodium (Colace) 100mg 3x daily, while taking narcotic pain medication.   For Constipation :   • Increase your water intake. Drink at least 8 glasses of water daily.  • Try adding fiber to your diet by eating fruits, vegetables and foods that are rich in grains.  • If you do experience constipation, you may take an over-the-counter laxative such as, Senokot, Miralax or  Milk of Magnesia.

## 2023-12-11 NOTE — PHYSICAL THERAPY INITIAL EVALUATION ADULT - TRANSFER SAFETY CONCERNS NOTED: SIT/STAND, REHAB EVAL
decreased proprioception/decreased step length/stepping too close to front of assistive device/decreased weight-shifting ability

## 2023-12-11 NOTE — DISCHARGE NOTE PROVIDER - PROVIDER TOKENS
PROVIDER:[TOKEN:[[id number]]] PROVIDER:[TOKEN:[[id number]]] PROVIDER:[TOKEN:[[id number]],SCHEDULEDAPPT:[12/26/2023],SCHEDULEDAPPTTIME:[11:15 AM],ESTABLISHEDPATIENT:[T]]

## 2023-12-11 NOTE — DISCHARGE NOTE PROVIDER - NSDCMRMEDTOKEN_GEN_ALL_CORE_FT
D3 25 mcg (1000 intl units) oral tablet: 1 tab(s) orally once a day  Hair, Skin, Nails 5 mg oral capsule: 1 cap(s) orally once a day  levothyroxine 50 mcg (0.05 mg) oral tablet: 1 tab(s) orally once a day  losartan 50 mg oral tablet: 1 tab(s) orally once a day  metoprolol succinate 50 mg oral tablet, extended release: 1 tab(s) orally once a day  Multiple Vitamins oral tablet: 1 tab(s) orally once a day  mupirocin 2% topical ointment: 1 application in each nostril 2 times a day  omeprazole 20 mg oral delayed release tablet: 1 tab(s) orally once a day (in the evening)  oxybutynin 15 mg/24 hr oral tablet, extended release: 1 tab(s) orally once a day  Vitamin C 500 mg oral tablet: 1 tab(s) orally once a day   acetaminophen 500 mg oral tablet: 2 tab(s) orally every 8 hours  Aspirin Enteric Coated 81 mg oral delayed release tablet: 1 tab(s) orally every 12 hours Take at least two hours before or after taking Meloxicam  D3 25 mcg (1000 intl units) oral tablet: 1 tab(s) orally once a day  Hair, Skin, Nails 5 mg oral capsule: 1 cap(s) orally once a day  levothyroxine 50 mcg (0.05 mg) oral tablet: 1 tab(s) orally once a day  losartan 50 mg oral tablet: 1 tab(s) orally once a day  meloxicam 15 mg oral tablet: 1 tab(s) orally once a day Take at least two hours before or after taking Aspirin  metoprolol succinate 50 mg oral tablet, extended release: 1 tab(s) orally once a day  Multiple Vitamins oral tablet: 1 tab(s) orally once a day  omeprazole 20 mg oral delayed release tablet: 1 tab(s) orally once a day (in the evening)  oxybutynin 15 mg/24 hr oral tablet, extended release: 1 tab(s) orally once a day  oxyCODONE 5 mg oral tablet: 1 tab(s) orally every 4 hours as needed for  severe pain [place]  [id number]  May take two tablets for severe pain, do not exceed 6 tablets in one day MDD: 6 tablets  polyethylene glycol 3350 oral powder for reconstitution: 17 gram(s) orally once a day (at bedtime)  senna leaf extract oral tablet: 2 tab(s) orally once a day (at bedtime)  Vitamin C 500 mg oral tablet: 1 tab(s) orally once a day   acetaminophen 500 mg oral tablet: 2 tab(s) orally every 8 hours  Aspirin Enteric Coated 81 mg oral delayed release tablet: 1 tab(s) orally every 12 hours Take at least two hours before or after taking Meloxicam  D3 25 mcg (1000 intl units) oral tablet: 1 tab(s) orally once a day  Hair, Skin, Nails 5 mg oral capsule: 1 cap(s) orally once a day  levothyroxine 50 mcg (0.05 mg) oral tablet: 1 tab(s) orally once a day  losartan 50 mg oral tablet: 1 tab(s) orally once a day  meloxicam 15 mg oral tablet: 1 tab(s) orally once a day Take at least two hours before or after taking Aspirin  metoprolol succinate 50 mg oral tablet, extended release: 1 tab(s) orally once a day  Multiple Vitamins oral tablet: 1 tab(s) orally once a day  omeprazole 20 mg oral delayed release tablet: 1 tab(s) orally once a day (in the evening)  oxybutynin 15 mg/24 hr oral tablet, extended release: 1 tab(s) orally once a day  oxyCODONE 5 mg oral tablet: 1 tab(s) orally every 4 hours as needed for  severe pain [place]  [id number]  May take two tablets for severe pain, do not exceed 6 tablets in one day MDD: 6 tablets  polyethylene glycol 3350 oral powder for reconstitution: 17 gram(s) orally once a day (at bedtime)  senna leaf extract oral tablet: 2 tab(s) orally once a day (at bedtime)  Vitamin C 500 mg oral tablet: 1 tab(s) orally once a day

## 2023-12-11 NOTE — BRIEF OPERATIVE NOTE - NSICDXBRIEFPOSTOP_GEN_ALL_CORE_FT
POST-OP DIAGNOSIS:  Primary localized osteoarthritis of right knee 11-Dec-2023 11:22:49  Toño Herndon

## 2023-12-11 NOTE — DISCHARGE NOTE PROVIDER - NSDCHHPTASSISTHOME_GEN_ALL_CORE
Problem: Chronic Conditions and Co-morbidities  Goal: Patient's chronic conditions and co-morbidity symptoms are monitored and maintained or improved  7/31/2023 1016 by Ranjit Thurston RN  Outcome: Completed  7/31/2023 1002 by Ranjit Thurston RN  Outcome: Progressing     Problem: Pain  Goal: Verbalizes/displays adequate comfort level or baseline comfort level  7/31/2023 1016 by Ranjit Thurston RN  Outcome: Completed  7/31/2023 1002 by Ranjit Thurston RN  Outcome: Progressing  Flowsheets (Taken 7/31/2023 1001)  Verbalizes/displays adequate comfort level or baseline comfort level: Assess pain using appropriate pain scale     Problem: Cognitive:  Goal: Knowledge of wound care  Description: Knowledge of wound care  7/31/2023 1016 by Ranjit Thurston RN  Outcome: Completed  7/31/2023 1002 by Ranjit Thurston RN  Outcome: Progressing  Goal: Understands risk factors for wounds  Description: Understands risk factors for wounds  7/31/2023 1016 by Ranjit Thurston RN  Outcome: Completed  7/31/2023 1002 by Ranjit Thurston RN  Outcome: Progressing     Problem: Venous:  Goal: Signs of wound healing will improve  Description: Signs of wound healing will improve  7/31/2023 1016 by Ranjit Thurston RN  Outcome: Completed  7/31/2023 1002 by Ranjit Thurston RN  Outcome: Progressing     Problem: Falls - Risk of:  Goal: Will remain free from falls  Description: Will remain free from falls  7/31/2023 1016 by Ranjit Thurston RN  Outcome: Completed  7/31/2023 1002 by Ranjit Thurston RN  Outcome: Progressing
Problem: Chronic Conditions and Co-morbidities  Goal: Patient's chronic conditions and co-morbidity symptoms are monitored and maintained or improved  Outcome: Progressing     Problem: Pain  Goal: Verbalizes/displays adequate comfort level or baseline comfort level  Outcome: Progressing     Problem: Cognitive:  Goal: Knowledge of wound care  Description: Knowledge of wound care  Outcome: Progressing  Goal: Understands risk factors for wounds  Description: Understands risk factors for wounds  Outcome: Progressing     Problem: Wound:  Goal: Will show signs of wound healing; wound closure and no evidence of infection  Description: Will show signs of wound healing; wound closure and no evidence of infection  Outcome: Progressing     Problem: Venous:  Goal: Signs of wound healing will improve  Description: Signs of wound healing will improve  Outcome: Progressing     Problem: Falls - Risk of:  Goal: Will remain free from falls  Description: Will remain free from falls  Outcome: Progressing
Patient Needs Assistance to Leave Residence...

## 2023-12-11 NOTE — DISCHARGE NOTE PROVIDER - NSDCFUSCHEDAPPT_GEN_ALL_CORE_FT
Clif Esteves  [facility] Physician [facility]  [place] [address] [facility]  Scheduled Appointment: 12/26/2023     Clif Esteves  [facility] Physician [facility]  [place] [address] [facility]  Scheduled Appointment: 12/26/2023

## 2023-12-11 NOTE — PHYSICAL THERAPY INITIAL EVALUATION ADULT - RANGE OF MOTION EXAMINATION, REHAB EVAL
R LE limited secondary to surgery/bilateral upper extremity ROM was WFL (within functional limits)/Left LE ROM was WFL (within functional limits)/deficits as listed below

## 2023-12-11 NOTE — CONSULT NOTE ADULT - ASSESSMENT
77 yo female, hypertension, gerd, overactive bladder, OA, hypothyroidism, migriane, presenting for right knee replacement.   dvt ppx, pain control as per ortho  - pt/ot  - ivf  - bowel regimen  - continue synthroid, toprol  - hold losartan for now  - monitor bp  - continue oxybutynin  - continue omeprazole  - will follow   79 yo female, hypertension, gerd, overactive bladder, OA, hypothyroidism, migriane, presenting for right knee replacement.   dvt ppx, pain control as per ortho  - pt/ot  - ivf  - bowel regimen  - continue synthroid, toprol  - hold losartan for now  - monitor bp  - continue oxybutynin  - continue omeprazole  - will follow

## 2023-12-11 NOTE — DISCHARGE NOTE PROVIDER - CARE PROVIDER_API CALL
Clif Esteves Anshu  Orthopaedic Surgery  [address] [place] [address]  Phone: [phone]  Fax: [phone]  Follow Up Time:    Clif Esteves Anshu  Orthopaedic Surgery  [address] [place] [address]  Phone: [phone]  Fax: [phone]  Follow Up Time:    Clif Esteves Anshu  Orthopaedic Surgery  [address] [place] [address]  Phone: [phone]  Fax: [phone]  Established Patient  Scheduled Appointment: 12/26/2023 11:15 AM   Clif Esteves Anshu  Orthopaedic Surgery  [address] [place] [address]  Phone: [phone]  Fax: [phone]  Established Patient  Scheduled Appointment: 12/26/2023 11:15 AM

## 2023-12-11 NOTE — CONSULT NOTE ADULT - SUBJECTIVE AND OBJECTIVE BOX
HPI:  79 yo female, pmh of hypertension, gerd, overactive bladder, OA, hypothyroidism, migriane, presenting for right knee replacement. Seen in pacu, denies current pain, not moving LE, has mild nausea, no dizziness, fever, chills, vomiting, shortness of breath    PAST MEDICAL & SURGICAL HISTORY:  HTN (hypertension)      GERD (gastroesophageal reflux disease)      Thyroid disease      History of basal cell cancer      OAB (overactive bladder)      COVID-19 vaccine series completed      History of COVID-19      Osteoarthritis      Primary osteoarthritis of right knee      History of migraine      Osteopenia      Varicose veins      Increased urinary frequency      Urge urinary incontinence      Obesity (BMI 30.0-34.9)      S/P tonsillectomy      Status post Mohs surgery for basal cell carcinoma      History of repair of left rotator cuff      History of repair of right rotator cuff      History of right cataract extraction      History of left cataract extraction      History of 2  sections          ANTIMICROBIAL:    CARDIOVASCULAR:    PULMONARY:    NEUROLOGIC:  HYDROmorphone  Injectable 0.25 milliGRAM(s) IV Push every 10 minutes PRN  HYDROmorphone  Injectable 0.5 milliGRAM(s) IV Push every 10 minutes PRN  meloxicam 15 milliGRAM(s) Oral once  ondansetron Injectable 4 milliGRAM(s) IV Push once PRN    ONCOLOGIC:    HEMATOLOGIC:    GATROINTESTINAL:     MEDS:    ENDO/METABOLIC:    IV FLUID/NUTRITION:  lactated ringers. 1000 milliLiter(s) IV Continuous <Continuous>    TOPICAL:    IMMUNOLOGIC & OTHER        Allergies    sulfa drugs (Pruritus)  penicillin (Other; Rash)    Intolerances      PAST MEDICAL & SURGICAL HISTORY:  HTN (hypertension)      GERD (gastroesophageal reflux disease)      Thyroid disease      History of basal cell cancer      OAB (overactive bladder)      COVID-19 vaccine series completed      History of COVID-19      Osteoarthritis      Primary osteoarthritis of right knee      History of migraine      Osteopenia      Varicose veins      Increased urinary frequency      Urge urinary incontinence      Obesity (BMI 30.0-34.9)      S/P tonsillectomy      Status post Mohs surgery for basal cell carcinoma      History of repair of left rotator cuff      History of repair of right rotator cuff      History of right cataract extraction      History of left cataract extraction      History of 2  sections        SOCIAL HISTORY:    FAMILY HISTORY:  Family history of hypertension (Father, Mother)    Family history of lung cancer (Sibling)    Family history of breast cancer (Sibling)    Family history of cervical cancer (Sibling)    Family history of sleep apnea (Child, Sibling)    Family history of dementia (Sibling)        Vital Signs Last 24 Hrs  T(C): 36.4 (11 Dec 2023 14:15), Max: 36.4 (11 Dec 2023 14:15)  T(F): 97.5 (11 Dec 2023 14:15), Max: 97.5 (11 Dec 2023 14:15)  HR: 54 (11 Dec 2023 15:15) (52 - 70)  BP: 145/72 (11 Dec 2023 15:15) (98/64 - 145/72)  BP(mean): --  RR: 15 (11 Dec 2023 15:15) (12 - 20)  SpO2: 99% (11 Dec 2023 15:15) (96% - 100%)    Parameters below as of 11 Dec 2023 14:45  Patient On (Oxygen Delivery Method): nasal cannula  O2 Flow (L/min): 2        I&O's Detail    11 Dec 2023 07:01  -  11 Dec 2023 15:27  --------------------------------------------------------  IN:    Lactated Ringers: 600 mL  Total IN: 600 mL    OUT:  Total OUT: 0 mL    Total NET: 600 mL        Daily     Daily     REVIEW OF SYSTEMS:    as per hpi, other systems reviewed and are negative    PHYSICAL EXAM:    GENERAL: NAD, well-groomed, older female  HEAD:  Atraumatic, Normocephalic  EYES: EOMI, PERRLA, conjunctiva and sclera clear  ENMT: No tonsillar erythema, Moist mucous membranes,  No lesions  NECK: Supple, No JVD,   NERVOUS SYSTEM:  Alert & Oriented X3, Good concentration; not moving b/l le due to anesthesia  CHEST/LUNG: Clear to percussion bilaterally; No rales, rhonchi, wheezing, or rubs  HEART: Regular rate and rhythm; No murmurs, rubs, or gallops  ABDOMEN: Soft, Nontender, Nondistended; Bowel sounds present  EXTREMITIES:  2+ Peripheral Pulses, No clubbing,  right knee wrapped in ace  LYMPH: No lymphadenopathy   SKIN: No rashes or lesions      LABS:                      RADIOLOGY & ADDITIONAL STUDIES:

## 2023-12-11 NOTE — DISCHARGE NOTE PROVIDER - NSDCACTIVITY_GEN_ALL_CORE
Stairs allowed/Walking - Indoors allowed/Walking - Outdoors allowed/Follow Instructions Provided by your Surgical Team Do not drive or operate machinery/Showering allowed/Do not make important decisions/Stairs allowed/Walking - Indoors allowed/No heavy lifting/straining/Walking - Outdoors allowed/Follow Instructions Provided by your Surgical Team

## 2023-12-11 NOTE — BRIEF OPERATIVE NOTE - NSICDXBRIEFPREOP_GEN_ALL_CORE_FT
PRE-OP DIAGNOSIS:  Primary localized osteoarthritis of right knee 11-Dec-2023 11:22:50  Toño Herndon

## 2023-12-12 ENCOUNTER — TRANSCRIPTION ENCOUNTER (OUTPATIENT)
Age: 78
End: 2023-12-12

## 2023-12-12 VITALS
HEART RATE: 67 BPM | RESPIRATION RATE: 18 BRPM | SYSTOLIC BLOOD PRESSURE: 151 MMHG | DIASTOLIC BLOOD PRESSURE: 77 MMHG | OXYGEN SATURATION: 94 % | TEMPERATURE: 98 F

## 2023-12-12 LAB
ANION GAP SERPL CALC-SCNC: 11 MMOL/L — SIGNIFICANT CHANGE UP (ref 5–17)
ANION GAP SERPL CALC-SCNC: 11 MMOL/L — SIGNIFICANT CHANGE UP (ref 5–17)
BUN SERPL-MCNC: 18 MG/DL — SIGNIFICANT CHANGE UP (ref 7–23)
BUN SERPL-MCNC: 18 MG/DL — SIGNIFICANT CHANGE UP (ref 7–23)
CALCIUM SERPL-MCNC: 9.1 MG/DL — SIGNIFICANT CHANGE UP (ref 8.4–10.5)
CALCIUM SERPL-MCNC: 9.1 MG/DL — SIGNIFICANT CHANGE UP (ref 8.4–10.5)
CHLORIDE SERPL-SCNC: 104 MMOL/L — SIGNIFICANT CHANGE UP (ref 96–108)
CHLORIDE SERPL-SCNC: 104 MMOL/L — SIGNIFICANT CHANGE UP (ref 96–108)
CO2 SERPL-SCNC: 24 MMOL/L — SIGNIFICANT CHANGE UP (ref 22–31)
CO2 SERPL-SCNC: 24 MMOL/L — SIGNIFICANT CHANGE UP (ref 22–31)
CREAT SERPL-MCNC: 1.14 MG/DL — SIGNIFICANT CHANGE UP (ref 0.5–1.3)
CREAT SERPL-MCNC: 1.14 MG/DL — SIGNIFICANT CHANGE UP (ref 0.5–1.3)
EGFR: 50 ML/MIN/1.73M2 — LOW
EGFR: 50 ML/MIN/1.73M2 — LOW
GLUCOSE SERPL-MCNC: 146 MG/DL — HIGH (ref 70–99)
GLUCOSE SERPL-MCNC: 146 MG/DL — HIGH (ref 70–99)
HCT VFR BLD CALC: 36.9 % — SIGNIFICANT CHANGE UP (ref 34.5–45)
HCT VFR BLD CALC: 36.9 % — SIGNIFICANT CHANGE UP (ref 34.5–45)
HGB BLD-MCNC: 12.1 G/DL — SIGNIFICANT CHANGE UP (ref 11.5–15.5)
HGB BLD-MCNC: 12.1 G/DL — SIGNIFICANT CHANGE UP (ref 11.5–15.5)
MCHC RBC-ENTMCNC: 29.2 PG — SIGNIFICANT CHANGE UP (ref 27–34)
MCHC RBC-ENTMCNC: 29.2 PG — SIGNIFICANT CHANGE UP (ref 27–34)
MCHC RBC-ENTMCNC: 32.8 GM/DL — SIGNIFICANT CHANGE UP (ref 32–36)
MCHC RBC-ENTMCNC: 32.8 GM/DL — SIGNIFICANT CHANGE UP (ref 32–36)
MCV RBC AUTO: 88.9 FL — SIGNIFICANT CHANGE UP (ref 80–100)
MCV RBC AUTO: 88.9 FL — SIGNIFICANT CHANGE UP (ref 80–100)
NRBC # BLD: 0 /100 WBCS — SIGNIFICANT CHANGE UP (ref 0–0)
NRBC # BLD: 0 /100 WBCS — SIGNIFICANT CHANGE UP (ref 0–0)
PLATELET # BLD AUTO: 268 K/UL — SIGNIFICANT CHANGE UP (ref 150–400)
PLATELET # BLD AUTO: 268 K/UL — SIGNIFICANT CHANGE UP (ref 150–400)
POTASSIUM SERPL-MCNC: 4.4 MMOL/L — SIGNIFICANT CHANGE UP (ref 3.5–5.3)
POTASSIUM SERPL-MCNC: 4.4 MMOL/L — SIGNIFICANT CHANGE UP (ref 3.5–5.3)
POTASSIUM SERPL-SCNC: 4.4 MMOL/L — SIGNIFICANT CHANGE UP (ref 3.5–5.3)
POTASSIUM SERPL-SCNC: 4.4 MMOL/L — SIGNIFICANT CHANGE UP (ref 3.5–5.3)
RBC # BLD: 4.15 M/UL — SIGNIFICANT CHANGE UP (ref 3.8–5.2)
RBC # BLD: 4.15 M/UL — SIGNIFICANT CHANGE UP (ref 3.8–5.2)
RBC # FLD: 12.9 % — SIGNIFICANT CHANGE UP (ref 10.3–14.5)
RBC # FLD: 12.9 % — SIGNIFICANT CHANGE UP (ref 10.3–14.5)
SODIUM SERPL-SCNC: 139 MMOL/L — SIGNIFICANT CHANGE UP (ref 135–145)
SODIUM SERPL-SCNC: 139 MMOL/L — SIGNIFICANT CHANGE UP (ref 135–145)
WBC # BLD: 12.76 K/UL — HIGH (ref 3.8–10.5)
WBC # BLD: 12.76 K/UL — HIGH (ref 3.8–10.5)
WBC # FLD AUTO: 12.76 K/UL — HIGH (ref 3.8–10.5)
WBC # FLD AUTO: 12.76 K/UL — HIGH (ref 3.8–10.5)

## 2023-12-12 PROCEDURE — C1776: CPT

## 2023-12-12 PROCEDURE — 97530 THERAPEUTIC ACTIVITIES: CPT

## 2023-12-12 PROCEDURE — 73560 X-RAY EXAM OF KNEE 1 OR 2: CPT

## 2023-12-12 PROCEDURE — S2900: CPT

## 2023-12-12 PROCEDURE — 97165 OT EVAL LOW COMPLEX 30 MIN: CPT

## 2023-12-12 PROCEDURE — 97116 GAIT TRAINING THERAPY: CPT

## 2023-12-12 PROCEDURE — 36415 COLL VENOUS BLD VENIPUNCTURE: CPT

## 2023-12-12 PROCEDURE — 97110 THERAPEUTIC EXERCISES: CPT

## 2023-12-12 PROCEDURE — 97535 SELF CARE MNGMENT TRAINING: CPT

## 2023-12-12 PROCEDURE — 85027 COMPLETE CBC AUTOMATED: CPT

## 2023-12-12 PROCEDURE — 99232 SBSQ HOSP IP/OBS MODERATE 35: CPT

## 2023-12-12 PROCEDURE — C1713: CPT

## 2023-12-12 PROCEDURE — 97161 PT EVAL LOW COMPLEX 20 MIN: CPT

## 2023-12-12 PROCEDURE — 80048 BASIC METABOLIC PNL TOTAL CA: CPT

## 2023-12-12 RX ORDER — POLYETHYLENE GLYCOL 3350 17 G/17G
17 POWDER, FOR SOLUTION ORAL
Qty: 0 | Refills: 0 | DISCHARGE
Start: 2023-12-12

## 2023-12-12 RX ORDER — METOPROLOL TARTRATE 50 MG
50 TABLET ORAL DAILY
Refills: 0 | Status: DISCONTINUED | OUTPATIENT
Start: 2023-12-12 | End: 2023-12-12

## 2023-12-12 RX ORDER — ASPIRIN/CALCIUM CARB/MAGNESIUM 324 MG
1 TABLET ORAL
Qty: 56 | Refills: 0
Start: 2023-12-12 | End: 2024-01-08

## 2023-12-12 RX ORDER — MELOXICAM 15 MG/1
1 TABLET ORAL
Qty: 28 | Refills: 0
Start: 2023-12-12 | End: 2024-01-08

## 2023-12-12 RX ORDER — ACETAMINOPHEN 500 MG
2 TABLET ORAL
Qty: 0 | Refills: 0 | DISCHARGE
Start: 2023-12-12

## 2023-12-12 RX ORDER — SENNA PLUS 8.6 MG/1
2 TABLET ORAL
Qty: 0 | Refills: 0 | DISCHARGE
Start: 2023-12-12

## 2023-12-12 RX ORDER — LEVOTHYROXINE SODIUM 125 MCG
50 TABLET ORAL DAILY
Refills: 0 | Status: DISCONTINUED | OUTPATIENT
Start: 2023-12-12 | End: 2023-12-12

## 2023-12-12 RX ORDER — OXYCODONE HYDROCHLORIDE 5 MG/1
1 TABLET ORAL
Qty: 42 | Refills: 0
Start: 2023-12-12 | End: 2023-12-18

## 2023-12-12 RX ADMIN — Medication 1000 MILLIGRAM(S): at 06:34

## 2023-12-12 RX ADMIN — Medication 100 MILLIGRAM(S): at 04:09

## 2023-12-12 RX ADMIN — MELOXICAM 15 MILLIGRAM(S): 15 TABLET ORAL at 11:42

## 2023-12-12 RX ADMIN — Medication 101.6 MILLIGRAM(S): at 05:22

## 2023-12-12 RX ADMIN — PANTOPRAZOLE SODIUM 40 MILLIGRAM(S): 20 TABLET, DELAYED RELEASE ORAL at 05:21

## 2023-12-12 RX ADMIN — Medication 1000 MILLIGRAM(S): at 07:00

## 2023-12-12 RX ADMIN — Medication 81 MILLIGRAM(S): at 05:21

## 2023-12-12 RX ADMIN — Medication 1000 MILLIGRAM(S): at 02:11

## 2023-12-12 RX ADMIN — Medication 400 MILLIGRAM(S): at 02:01

## 2023-12-12 RX ADMIN — OXYCODONE HYDROCHLORIDE 5 MILLIGRAM(S): 5 TABLET ORAL at 00:35

## 2023-12-12 NOTE — CARE COORDINATION ASSESSMENT. - NSDCPLANSERVICES_GEN_ALL_CORE
CM met with patient at bedside.  Patient. A&O x 4. Pt s/p  PROCEDURES: Total right knee replacement.   Pt  resting comfortably / Pt has no complaints at this time.  CM explained role of CM and availability to assist with discharge planning throughout stay.   Provided CM contact information and pt. verbalized understanding. Patient lives in a private house with her , 5 steps to enter with HR. Prior to surgery patient was ind in adls Patient identified her  as her caregiver at home who will assist her during her recuperation and will transport her home and to MD appointments.   Pt. is agreeable with PT/OT's recommendations for d/c plan to home with HC/PT.  CM explained home care expectations, process, insurance provisions and home safety with good understanding.   Offered list of [facility] and pt. chose [facility] at home care agency.  Provided discharge resources folder. CM made referral accordingly.  Informed them of Anticipated  DC date for today 12/12/2023 and for SOC tomorrow 12/13/2023.  Pt verbalizing understanding and in agreement with d/c plans.   DME: Pt has a rolling walker and cane  at home.  PCP: Dr rAti Larry [phone]  pharmacy: [facility] o[place] [phone]/Home Care/Other CM met with patient at bedside.  Patient. A&O x 4. Pt s/p  PROCEDURES: Total right knee replacement.   Pt  resting comfortably / Pt has no complaints at this time.  CM explained role of CM and availability to assist with discharge planning throughout stay.   Provided CM contact information and pt. verbalized understanding. Patient lives in a private house with her , 5 steps to enter with HR. Prior to surgery patient was ind in adls Patient identified her  as her caregiver at home who will assist her during her recuperation and will transport her home and to MD appointments.   Pt. is agreeable with PT/OT's recommendations for d/c plan to home with HC/PT.  CM explained home care expectations, process, insurance provisions and home safety with good understanding.   Offered list of [facility] and pt. chose [facility] at home care agency.  Provided discharge resources folder. CM made referral accordingly.  Informed them of Anticipated  DC date for today 12/12/2023 and for SOC tomorrow 12/13/2023.  Pt verbalizing understanding and in agreement with d/c plans.   DME: Pt has a rolling walker and cane  at home.  PCP: Dr Arti Larry [phone]  pharmacy: [facility] [place] [phone]/Home Care/Other

## 2023-12-12 NOTE — OCCUPATIONAL THERAPY INITIAL EVALUATION ADULT - BED MOBILITY/TRANSFERS, PREVIOUS LEVEL OF FUNCTION, OT EVAL
Writer talked with pt's wife regarding message below. Per Dr. Viktor Phelan pt is able to take Mucinex DM. Also encouraged to use a humidifier in the home. Pt's wife reported understanding. independent

## 2023-12-12 NOTE — CARE COORDINATION ASSESSMENT. - NSCAREPROVIDERS_GEN_ALL_CORE_FT
CARE PROVIDERS:  Administration: Josefa Johansen  Administration: Drea Asencio  Admitting: Clif Esteves  Attending: Clif Esteves  Case Management: Santaromana, Anna  Covering Team: Lico Santos  Infection Control: Alee Gonzalez  Nurse: Maile Jensen  Nurse: Raven Coughlin  Nurse: Hanny Holt  Nurse: Tegan Rdz  Occupational Therapy: Marce Montana  PCA/Nursing Assistant: Ninfa Beth  Physical Therapy: Rose Higuera  Primary Team: Toño Herndon  Primary Team: Magda Escobar  Primary Team: Medina Reich  Primary Team: Elieser Wilson  Primary Team: Chucho Chisholm  Quality Review: Alissa Ramachandran  Registered Dietitian: Hollie Hanson  Team: [facility] HENDRICKS Hospitalists, Team  UR// Supp. Assoc.: Emily Holt

## 2023-12-12 NOTE — OCCUPATIONAL THERAPY INITIAL EVALUATION ADULT - DIAGNOSIS, OT EVAL
Pt with impaired ROM, strength, and balance in R knee impacting ability to complete ADLs, IADLs, functional mobility/transfers.

## 2023-12-12 NOTE — PROGRESS NOTE ADULT - SUBJECTIVE AND OBJECTIVE BOX
Discharge medication calendar:  ASA EC 81mg q12h x 4 weeks  APAP 1000mg q8h x 2-3 weeks  Meloxicam 15mg QAM x 2-3 weeks  Omeprazole 20mg QAM (home)  Narcotic PRN  Docusate 100mg TID while taking narcotic  Miralax, Senna, or Bisacodyl PRN for treatment of constipation  
POD #: 1   S/P  Right Total Knee Arthroplasty               SUBJECTIVE: Patient seen and examined at bedside. Denies nausea, vomiting, diarrhea, chest pain, shortness of breath, headache, dizziness, numbness, tingling. Patient states desire to be discharged home today.  Reported Pain Score = 3/10    OBJECTIVE:     Vital Signs Last 24 Hrs  T(C): 36.9 (12 Dec 2023 07:58), Max: 36.9 (12 Dec 2023 07:58)  T(F): 98.4 (12 Dec 2023 07:58), Max: 98.4 (12 Dec 2023 07:58)  HR: 67 (12 Dec 2023 07:58) (50 - 76)  BP: 151/77 (12 Dec 2023 07:58) (98/64 - 169/78)  BP(mean): --  RR: 18 (12 Dec 2023 07:58) (12 - 20)  SpO2: 94% (12 Dec 2023 07:58) (94% - 100%)    Parameters below as of 12 Dec 2023 07:58  Patient On (Oxygen Delivery Method): room air    Right Knee:          Dressing: clean/dry/intact    Bilateral LEs:         Sensation:  intact to light touch          Motor exam:  5/5 dorsiflexion/plantarflexion/EHL          2+ DP and PT pulses          calf supple, NT         SCDs in place    LABS:                        12.1   12.76 )-----------( 268      ( 12 Dec 2023 08:09 )             36.9     12-12    139  |  104  |  18  ----------------------------<  146<H>  4.4   |  24  |  1.14    Ca    9.1      12 Dec 2023 08:09    Urinalysis Basic - ( 12 Dec 2023 08:09 )    Color: x / Appearance: x / SG: x / pH: x  Gluc: 146 mg/dL / Ketone: x  / Bili: x / Urobili: x   Blood: x / Protein: x / Nitrite: x   Leuk Esterase: x / RBC: x / WBC x   Sq Epi: x / Non Sq Epi: x / Bacteria: x    MEDICATIONS:  Anticoagulation:  aspirin enteric coated 81 milliGRAM(s) Oral every 12 hours    Pain medications:   acetaminophen     Tablet .. 1000 milliGRAM(s) Oral every 8 hours  HYDROmorphone  Injectable 0.5 milliGRAM(s) IV Push every 3 hours PRN  meloxicam 15 milliGRAM(s) Oral daily  ondansetron Injectable 4 milliGRAM(s) IV Push every 6 hours PRN  oxyCODONE    IR 5 milliGRAM(s) Oral every 3 hours PRN  oxyCODONE    IR 10 milliGRAM(s) Oral every 3 hours PRN    A/P : Patient stable s/p Right Total Knee Arthroplasty POD # 1  -    Pain control  -    DVT ppx: Aspirin 81mg BID  -    Weight bearing status: WBAT RLE  -    Physical Therapy  -    Occupational Therapy  -    Discharge plan: home today pending PT/OT/medical clearance
Patient is a 78y old  Female who presents with a chief complaint of right knee replacement (11 Dec 2023 15:24)      INTERVAL HPI/OVERNIGHT EVENTS:  Patient seen and examined in am, feels well, able to ambulate with walker, pain is well controlled. Denies dizziness fever, chills, nausea, vomiting.     ROS reviewed and is otherwise negative        Vital Signs Last 24 Hrs  T(C): 36.9 (12 Dec 2023 07:58), Max: 36.9 (12 Dec 2023 07:58)  T(F): 98.4 (12 Dec 2023 07:58), Max: 98.4 (12 Dec 2023 07:58)  HR: 67 (12 Dec 2023 07:58) (50 - 76)  BP: 151/77 (12 Dec 2023 07:58) (98/64 - 169/78)  BP(mean): --  RR: 18 (12 Dec 2023 07:58) (12 - 20)  SpO2: 94% (12 Dec 2023 07:58) (94% - 100%)    Parameters below as of 12 Dec 2023 07:58  Patient On (Oxygen Delivery Method): room air        PHYSICAL EXAM:  GENERAL: NAD, well-groomed, older female  HEAD:  Atraumatic, Normocephalic  EYES: EOMI, PERRLA, conjunctiva and sclera clear  ENMT: No tonsillar erythema, Moist mucous membranes,  No lesions  NECK: Supple, No JVD,   NERVOUS SYSTEM:  Alert & Oriented X3, Good concentration; moving all extremities  CHEST/LUNG: Clear to percussion bilaterally; No rales, rhonchi, wheezing, or rubs  HEART: Regular rate and rhythm; No murmurs, rubs, or gallops  ABDOMEN: Soft, Nontender, Nondistended; Bowel sounds present  EXTREMITIES:  2+ Peripheral Pulses, No clubbing,  right knee site c/d/i  LYMPH: No lymphadenopathy   SKIN: No rashes or lesions      MEDICATIONS  (STANDING):  acetaminophen     Tablet .. 1000 milliGRAM(s) Oral every 8 hours  aspirin enteric coated 81 milliGRAM(s) Oral every 12 hours  lactated ringers. 1000 milliLiter(s) (125 mL/Hr) IV Continuous <Continuous>  levothyroxine 50 MICROGram(s) Oral daily  meloxicam 15 milliGRAM(s) Oral daily  metoprolol succinate ER 50 milliGRAM(s) Oral daily  pantoprazole    Tablet 40 milliGRAM(s) Oral before breakfast  polyethylene glycol 3350 17 Gram(s) Oral at bedtime  senna 2 Tablet(s) Oral at bedtime    MEDICATIONS  (PRN):  aluminum hydroxide/magnesium hydroxide/simethicone Suspension 30 milliLiter(s) Oral four times a day PRN Indigestion  HYDROmorphone  Injectable 0.5 milliGRAM(s) IV Push every 3 hours PRN Breakthrough pain  magnesium hydroxide Suspension 30 milliLiter(s) Oral daily PRN Constipation  ondansetron Injectable 4 milliGRAM(s) IV Push every 6 hours PRN Nausea and/or Vomiting  oxyCODONE    IR 10 milliGRAM(s) Oral every 3 hours PRN Severe Pain (7 - 10)  oxyCODONE    IR 5 milliGRAM(s) Oral every 3 hours PRN Moderate Pain (4 - 6)      Allergies    sulfa drugs (Pruritus)  penicillin (Other; Rash)    Intolerances          LABS:                        12.1   12.76 )-----------( 268      ( 12 Dec 2023 08:09 )             36.9     12 Dec 2023 08:09    139    |  104    |  18     ----------------------------<  146    4.4     |  24     |  1.14     Ca    9.1        12 Dec 2023 08:09        Urinalysis Basic - ( 12 Dec 2023 08:09 )    Color: x / Appearance: x / SG: x / pH: x  Gluc: 146 mg/dL / Ketone: x  / Bili: x / Urobili: x   Blood: x / Protein: x / Nitrite: x   Leuk Esterase: x / RBC: x / WBC x   Sq Epi: x / Non Sq Epi: x / Bacteria: x      CAPILLARY BLOOD GLUCOSE          RADIOLOGY & ADDITIONAL TESTS:        Care Discussed with Consultants/Other Providers:    Advanced Directives: [ ] DNR  [ ] No feeding tube  [ ] MOLST in chart  [ ] MOLST completed today  [ ] Unknown

## 2023-12-12 NOTE — PATIENT CHOICE NOTE. - NSPTCHOICESTATE_GEN_ALL_CORE
I have met with the patient and/or caregiver to discuss discharge goals and treatment plan. Patient and/or caregiver also provided with instructions on accessing the CMS Compare websites for additional information related to Post Acute Provider quality and resource use measures to assist them in evaluation of the providers and in selecting their post-acute provider of choice. Patient and caregiver were informed of the facilities that are owned and/or operated by [facility]. I have discussed with the patient the availability of in-network facilities and providers. Patient and caregiver provided with a list of post-acute providers whose services are appropriate to the discharge plans and patient needs.     For patient requiring durable medical equipment, patient and/or caregiver were informed that they have the right to request who provides the required equipment. I have met with the patient and/or caregiver to discuss discharge goals and treatment plan. Patient and/or caregiver also provided with instructions on accessing the CMS Compare websites for additional information related to Post Acute Provider quality and resource use measures to assist them in evaluation of the providers and in selecting their post-acute provider of choice. Patient and caregiver were informed of the facilities that are owned and/or operated by [facility]. I have discussed with the patient the availability of in-network facilities and providers. Patient and caregiver provided with a list of post-acute providers whose services are appropriate to the discharge plans and patient needs.     For patient requiring durable medical equipment, patient and/or caregiver were informed that they have the right to request who provides the required equipment.

## 2023-12-12 NOTE — OCCUPATIONAL THERAPY INITIAL EVALUATION ADULT - RANGE OF MOTION EXAMINATION, LOWER EXTREMITY
Except R knee/Left LE Active ROM was WFL (within functional limits)/Right LE Active ROM was WFL   (within functional limits)

## 2023-12-12 NOTE — DISCHARGE NOTE NURSING/CASE MANAGEMENT/SOCIAL WORK - NSDCPEFALRISK_GEN_ALL_CORE
For information on Fall & Injury Prevention, visit: [email or web address] OR  [email or web address] OR  https://www.cdc.gov/steadi/patient.html For information on Fall & Injury Prevention, visit: [email or web address] OR  [email or web address] OR  https://www.cdc.gov/steadi/patient.html

## 2023-12-12 NOTE — OCCUPATIONAL THERAPY INITIAL EVALUATION ADULT - TRANSFER TRAINING, PT EVAL
Pt will complete tub transfer with CGA by 2-3 sessions. Pt will complete toilet transfer with modified independence by 2-3 sessions.

## 2023-12-12 NOTE — DISCHARGE NOTE NURSING/CASE MANAGEMENT/SOCIAL WORK - PATIENT PORTAL LINK FT
You can access the FollowMyHealth Patient Portal offered by [facility] by registering at the following website: [email or web address]. By joining [employer]’s FollowMyHealth portal, you will also be able to view your health information using other applications (apps) compatible with our system. You can access the FollowMyHealth Patient Portal offered by [facility] by registering at the following website: [email or web address]. By joining [employer]’s FollowMyHealth portal, you will also be able to view your health information using other applications (apps) compatible with our system.

## 2023-12-12 NOTE — OCCUPATIONAL THERAPY INITIAL EVALUATION ADULT - SHORT TERM MEMORY, REHAB EVAL
[facility]   Section - Operative Note    Nelly Aranda Patient Status:  Inpatient    1979 MRN [id number]   Location [address] [facility] Attending Darian Tejeda MD   Hosp Day # 0 PCP No primary care provider on file. done and clear fluid noted. The head was delivered and the infant was bulb suctioned. The remainder of the body was delivered without complication. The infant was vigorously crying.  The cord was clamped and cut and infant was handed to the awaiting vicente intact

## 2023-12-12 NOTE — PROGRESS NOTE ADULT - ASSESSMENT
79 yo female, hypertension, gerd, overactive bladder, OA, hypothyroidism, migriane, presenting for right knee replacement.   dvt ppx, pain control as per ortho  - bowel regimen  - continue synthroid, toprol  - can resume losartan on DC  - continue oxybutynin  - continue omeprazole  - no medical contraindication to DC

## 2023-12-12 NOTE — OCCUPATIONAL THERAPY INITIAL EVALUATION ADULT - LIVES WITH, PROFILE
Pt lives in a private home with her spouse with 5 BAKARI and all needs met on first floor. Pt has a tub with grab bars and owns a TTB. Pt owns a RW, cane, and commode. Pt reports she was independent with all ADLs PTA./spouse

## 2023-12-12 NOTE — CARE COORDINATION ASSESSMENT. - NSPASTMEDSURGHISTORY_GEN_ALL_CORE_FT
PAST MEDICAL & SURGICAL HISTORY:  OAB (overactive bladder)      History of basal cell cancer      Thyroid disease      GERD (gastroesophageal reflux disease)      HTN (hypertension)      S/P tonsillectomy      Obesity (BMI 30.0-34.9)      Urge urinary incontinence      Increased urinary frequency      Varicose veins      Osteopenia      History of migraine      Primary osteoarthritis of right knee      Osteoarthritis      History of COVID-19      COVID-19 vaccine series completed      History of 2  sections      History of left cataract extraction      History of right cataract extraction      History of repair of right rotator cuff      History of repair of left rotator cuff      Status post Mohs surgery for basal cell carcinoma

## 2023-12-12 NOTE — DISCHARGE NOTE NURSING/CASE MANAGEMENT/SOCIAL WORK - NSSCNAMETXT_GEN_ALL_CORE
[facility] care agency [phone] will reach out to you within 24-72 hours of your discharge to schedule home care visit/eval appointment with you. Please call agency for any queries regarding home care services  [facility] care agency [phone] will reach out to you within 24-72 hours of your discharge to schedule home care visit/eval appointment with you. Please call agency for any queries regarding home care services

## 2023-12-26 ENCOUNTER — APPOINTMENT (OUTPATIENT)
Dept: ORTHOPEDIC SURGERY | Facility: CLINIC | Age: 78
End: 2023-12-26
Payer: MEDICARE

## 2023-12-26 VITALS — HEIGHT: 57 IN | BODY MASS INDEX: 31.5 KG/M2 | WEIGHT: 146 LBS

## 2023-12-26 PROBLEM — Z86.16 PERSONAL HISTORY OF COVID-19: Chronic | Status: ACTIVE | Noted: 2023-11-27

## 2023-12-26 PROBLEM — M85.80 OTHER SPECIFIED DISORDERS OF BONE DENSITY AND STRUCTURE, UNSPECIFIED SITE: Chronic | Status: ACTIVE | Noted: 2023-11-27

## 2023-12-26 PROBLEM — N39.41 URGE INCONTINENCE: Chronic | Status: ACTIVE | Noted: 2023-11-27

## 2023-12-26 PROBLEM — M19.90 UNSPECIFIED OSTEOARTHRITIS, UNSPECIFIED SITE: Chronic | Status: ACTIVE | Noted: 2023-11-27

## 2023-12-26 PROBLEM — R35.0 FREQUENCY OF MICTURITION: Chronic | Status: ACTIVE | Noted: 2023-11-27

## 2023-12-26 PROBLEM — Z86.69 PERSONAL HISTORY OF OTHER DISEASES OF THE NERVOUS SYSTEM AND SENSE ORGANS: Chronic | Status: ACTIVE | Noted: 2023-11-27

## 2023-12-26 PROBLEM — M17.11 UNILATERAL PRIMARY OSTEOARTHRITIS, RIGHT KNEE: Chronic | Status: ACTIVE | Noted: 2023-11-27

## 2023-12-26 PROBLEM — Z92.29 PERSONAL HISTORY OF OTHER DRUG THERAPY: Chronic | Status: ACTIVE | Noted: 2023-11-27

## 2023-12-26 PROBLEM — E66.9 OBESITY, UNSPECIFIED: Chronic | Status: ACTIVE | Noted: 2023-11-27

## 2023-12-26 PROBLEM — I83.90 ASYMPTOMATIC VARICOSE VEINS OF UNSPECIFIED LOWER EXTREMITY: Chronic | Status: ACTIVE | Noted: 2023-11-27

## 2023-12-26 PROCEDURE — 99024 POSTOP FOLLOW-UP VISIT: CPT

## 2023-12-26 PROCEDURE — 73562 X-RAY EXAM OF KNEE 3: CPT | Mod: RT

## 2023-12-26 NOTE — ASSESSMENT
[FreeTextEntry1] : S/P RT TKA 12/11/23: NO F/C/S. DOING WELL. XRAYS REVIEWED WITH COMPONENTS WELL FIXED. NO SIGNS OF INFECTION. GOOD LIGAMENT BALANCE ON EXAM.  DISCUSSED THE IMPORTANCE OF ELEVATION TO REDUCE SWELLING. PROPER ELEVATION TECHNIQUES DISCUSSED. ABX AND PRECAUTIONS REVIEWED. PT RX. QUESTIONS ANSWERED.

## 2023-12-26 NOTE — PHYSICAL EXAM
[Right] : right knee [] : ambulation with cane [de-identified] : WAS NOT ASSESSED.  [TWNoteComboBox7] : flexion 100 degrees [de-identified] : extension 0 degrees

## 2023-12-26 NOTE — HISTORY OF PRESENT ILLNESS
[Right Leg] : right leg [Gradual] : gradual [9] : 9 [7] : 7 [Dull/Aching] : dull/aching [Localized] : localized [Intermittent] : intermittent [Rest] : rest [Injection therapy] : injection therapy [Standing] : standing [Walking] : walking [Stairs] : stairs [Retired] : Work status: retired [Other:____] : [unfilled] [de-identified] : 03/21/2023 here for a follow up on the right knee had csi on 07/12/2022 with good relief   07/25/23 follow up right knee ,had csi last visit with good relief   12/26/23: Here for first post op s/p right TKA. Doing well.  [] : Post Surgical Visit: no [FreeTextEntry1] : RIGHT KNEE [FreeTextEntry5] : follow up with right knee\par  finished orthovisc last time  [de-identified] : csi 07/12/2022\par  03/21/2023 csi [de-identified] : rt knee [TWNoteComboBox1] : 0%

## 2024-02-06 ENCOUNTER — APPOINTMENT (OUTPATIENT)
Dept: ORTHOPEDIC SURGERY | Facility: CLINIC | Age: 79
End: 2024-02-06
Payer: MEDICARE

## 2024-02-06 PROCEDURE — 99024 POSTOP FOLLOW-UP VISIT: CPT

## 2024-02-06 NOTE — PHYSICAL EXAM
[Right] : right knee [] : no tenderness [de-identified] : WAS NOT ASSESSED.  [TWNoteComboBox7] : flexion 110 degrees [de-identified] : extension 0 degrees

## 2024-02-06 NOTE — HISTORY OF PRESENT ILLNESS
[Right Leg] : right leg [Gradual] : gradual [9] : 9 [7] : 7 [Dull/Aching] : dull/aching [Localized] : localized [Intermittent] : intermittent [Rest] : rest [Injection therapy] : injection therapy [Standing] : standing [Walking] : walking [Stairs] : stairs [Retired] : Work status: retired [Other:____] : [unfilled] [de-identified] : 03/21/2023 here for a follow up on the right knee had csi on 07/12/2022 with good relief   07/25/23 follow up right knee ,had csi last visit with good relief   12/26/23: Here for first post op s/p right TKA. Doing well.    02/06/24 follow up s/p right tka doing pt  [] : Post Surgical Visit: no [FreeTextEntry1] : RIGHT KNEE [FreeTextEntry5] : follow up with right knee\par  finished orthovisc last time  [de-identified] : csi 07/12/2022 03/21/2023 csi pt  [de-identified] : rt knee [TWNoteComboBox1] : 0%

## 2024-03-11 DIAGNOSIS — Z01.818 ENCOUNTER FOR OTHER PREPROCEDURAL EXAMINATION: ICD-10-CM

## 2024-03-29 ENCOUNTER — NON-APPOINTMENT (OUTPATIENT)
Age: 79
End: 2024-03-29

## 2024-05-01 ENCOUNTER — OFFICE (OUTPATIENT)
Dept: URBAN - METROPOLITAN AREA CLINIC 102 | Facility: CLINIC | Age: 79
Setting detail: OPHTHALMOLOGY
End: 2024-05-01
Payer: MEDICARE

## 2024-05-01 DIAGNOSIS — H11.442: ICD-10-CM

## 2024-05-01 DIAGNOSIS — H01.001: ICD-10-CM

## 2024-05-01 DIAGNOSIS — C44.1122: ICD-10-CM

## 2024-05-01 DIAGNOSIS — H01.004: ICD-10-CM

## 2024-05-01 DIAGNOSIS — H16.223: ICD-10-CM

## 2024-05-01 PROCEDURE — 92012 INTRM OPH EXAM EST PATIENT: CPT | Performed by: OPHTHALMOLOGY

## 2024-05-01 ASSESSMENT — LID EXAM ASSESSMENTS
OD_BLEPHARITIS: RUL
OD_TRICHIASIS: ABSENT
OS_BLEPHARITIS: LUL

## 2024-05-01 ASSESSMENT — CONFRONTATIONAL VISUAL FIELD TEST (CVF)
OS_FINDINGS: FULL
OD_FINDINGS: FULL

## 2024-05-07 ENCOUNTER — APPOINTMENT (OUTPATIENT)
Dept: ORTHOPEDIC SURGERY | Facility: CLINIC | Age: 79
End: 2024-05-07
Payer: MEDICARE

## 2024-05-07 VITALS — WEIGHT: 149 LBS | BODY MASS INDEX: 32.15 KG/M2 | HEIGHT: 57 IN

## 2024-05-07 DIAGNOSIS — Z96.651 PRESENCE OF RIGHT ARTIFICIAL KNEE JOINT: ICD-10-CM

## 2024-05-07 PROCEDURE — 99213 OFFICE O/P EST LOW 20 MIN: CPT

## 2024-05-07 PROCEDURE — 73562 X-RAY EXAM OF KNEE 3: CPT | Mod: RT

## 2024-05-07 NOTE — HISTORY OF PRESENT ILLNESS
[Right Leg] : right leg [Gradual] : gradual [Dull/Aching] : dull/aching [Localized] : localized [Intermittent] : intermittent [Rest] : rest [Injection therapy] : injection therapy [Standing] : standing [Walking] : walking [Stairs] : stairs [Retired] : Work status: retired [Other:____] : [unfilled] [5] : 5 [de-identified] : 03/21/2023 here for a follow up on the right knee had csi on 07/12/2022 with good relief   07/25/23 follow up right knee ,had csi last visit with good relief   12/26/23: Here for first post op s/p right TKA. Doing well.   02/06/24 follow up s/p right tka doing pt   5/7/24: Follow up for right knee. Doing well.  [] : Post Surgical Visit: no [FreeTextEntry1] : RIGHT KNEE [FreeTextEntry5] : follow up with right knee\par  finished orthovisc last time  [de-identified] : rt knee [TWNoteComboBox1] : 0%

## 2024-05-07 NOTE — PHYSICAL EXAM
[Right] : right knee [] : no tenderness [de-identified] : WAS NOT ASSESSED.  [TWNoteComboBox7] : flexion 115 degrees [de-identified] : extension 0 degrees

## 2024-07-10 ENCOUNTER — APPOINTMENT (OUTPATIENT)
Dept: ORTHOPEDIC SURGERY | Facility: CLINIC | Age: 79
End: 2024-07-10
Payer: COMMERCIAL

## 2024-07-10 VITALS — BODY MASS INDEX: 32.15 KG/M2 | WEIGHT: 149 LBS | HEIGHT: 57 IN

## 2024-07-10 DIAGNOSIS — G89.29 CERVICALGIA: ICD-10-CM

## 2024-07-10 DIAGNOSIS — M54.2 CERVICALGIA: ICD-10-CM

## 2024-07-10 DIAGNOSIS — M54.9 CERVICALGIA: ICD-10-CM

## 2024-07-10 PROCEDURE — 72070 X-RAY EXAM THORAC SPINE 2VWS: CPT

## 2024-07-10 PROCEDURE — 72110 X-RAY EXAM L-2 SPINE 4/>VWS: CPT

## 2024-07-10 PROCEDURE — 99204 OFFICE O/P NEW MOD 45 MIN: CPT

## 2024-07-10 PROCEDURE — 72050 X-RAY EXAM NECK SPINE 4/5VWS: CPT

## 2024-08-02 NOTE — DISCHARGE NOTE NURSING/CASE MANAGEMENT/SOCIAL WORK - HISTORY OF COVID-19 VACCINATION
08/02/24 1044   Family Communication    Relationship to Patient Partner    Phone Number [facility] [phone]   Family/Significant Other Update Called;Updated   Delivery Origin Nurse   Message Disposition Family present - message delivered   Update Given Yes   Family Communication   Family Update Message Procedure started;Surgeon working;Patient stable       
Yes

## 2024-08-21 ENCOUNTER — APPOINTMENT (OUTPATIENT)
Dept: ORTHOPEDIC SURGERY | Facility: CLINIC | Age: 79
End: 2024-08-21
Payer: COMMERCIAL

## 2024-08-21 DIAGNOSIS — M54.9 CERVICALGIA: ICD-10-CM

## 2024-08-21 DIAGNOSIS — G89.29 CERVICALGIA: ICD-10-CM

## 2024-08-21 DIAGNOSIS — M54.2 CERVICALGIA: ICD-10-CM

## 2024-08-21 PROCEDURE — 99213 OFFICE O/P EST LOW 20 MIN: CPT

## 2024-08-21 RX ORDER — METHYLPREDNISOLONE 4 MG/1
4 TABLET ORAL
Qty: 1 | Refills: 0 | Status: ACTIVE | COMMUNITY
Start: 2024-08-21 | End: 1900-01-01
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
## 2024-08-21 NOTE — REASON FOR VISIT
You can access the FollowMyHealth Patient Portal offered by  by registering at the following website: [email or web address]. By joining [employer]’s FollowMyHealth portal, you will also be able to view your health information using other applications (apps) compatible with our system.
[FreeTextEntry2] : neck and back NF DOA: 06/20/2024

## 2024-08-21 NOTE — IMAGING
[Fracture] : Fracture [Scoliosis] : Scoliosis [Facet arthropathy] : Facet arthropathy [Disc space narrowing] : Disc space narrowing [de-identified] : Spine: Inspection/Palpation: No tenderness to palpation throughout Cervical/thoracic/lumbar spine. No bony stepoffs, No lesions.   Gait: Non-antalgic, able to perform bilateral toe and heel rise.  Able to perform tandem gait.    Range of Motion: Cervical Spine: Flexion to chin to chest, extension to 70 degrees,  rotation 90 degrees bilaterally, Lateral flexion to 45 degrees bilaterally Lumbar Spine: Flexion to 90 degrees, Extension to 30 degrees, rotation 30 degrees bilaterally, lateral flexion to 30 degrees bilaterally.   Neurologic: Bilateral upper extremities 5/5 Deltoid/Biceps/Triceps/ Wrist Flexion/Wrist Extension/ / Intrinsics Except Bilateral Lower Extremities 5/5 Iliopsoas/Quadriceps/Hamstrings/ Tibialis Anterior/ Gastrocnemius. Extensor Hallucis Longus/ Flexor Hallucis Longus except   Sensation intact to light touch C5-T1 Sensation intact to light touch L2-S1   Biceps/Triceps/Brachioradialis Reflex within normal limits Patellar/ Achilles reflex within normal limits.   Negative Ac's,  No inverted brachioradialis reflex   Negative straight leg raise

## 2024-08-21 NOTE — ASSESSMENT
[FreeTextEntry1] : 78 F with neck and back pain.  Left cervical radic. Pain persists despite PT  Had CT c spin showing no fracture at good [facility] MRI C and L spine Trial of MDP FU after MRIs

## 2024-08-30 ENCOUNTER — RESULT REVIEW (OUTPATIENT)
Age: 79
End: 2024-08-30

## 2024-09-04 NOTE — BRIEF OPERATIVE NOTE - NSICDXBRIEFPROCEDURE_GEN_ALL_CORE_FT
Detail Level: Generalized
Initiate Treatment: Nystatin mixed with hydrocortisone BID
PROCEDURES:  Reverse total shoulder replacement 28-Jun-2021 15:05:27  Sophia Marina  
Initiate Treatment: Hydrocortisone 2.5% topical cream QD PRN

## 2024-09-11 ENCOUNTER — APPOINTMENT (OUTPATIENT)
Dept: ORTHOPEDIC SURGERY | Facility: CLINIC | Age: 79
End: 2024-09-11
Payer: COMMERCIAL

## 2024-09-11 DIAGNOSIS — G89.29 CERVICALGIA: ICD-10-CM

## 2024-09-11 DIAGNOSIS — M54.9 CERVICALGIA: ICD-10-CM

## 2024-09-11 DIAGNOSIS — M54.2 CERVICALGIA: ICD-10-CM

## 2024-09-11 PROCEDURE — 99213 OFFICE O/P EST LOW 20 MIN: CPT

## 2024-09-11 NOTE — ASSESSMENT
[FreeTextEntry1] : 78 F with neck and back pain.  Left cervical radic. Pain persists despite PT  Had CT c spin showing no fracture at good [facility] MRI with no fractures or cord compression. Pain management referral  FU PRN Discussed continued PT patient not interested at this time.

## 2024-09-11 NOTE — DATA REVIEWED
[MRI] : MRI [Cervical Spine] : cervical spine [Lumbar Spine] : lumbar spine [I independently reviewed and interpreted images and report] : I independently reviewed and interpreted images and report [FreeTextEntry1] : multilevel degenerative changes. no fractures or cord compression

## 2024-09-11 NOTE — IMAGING
[Fracture] : Fracture [Scoliosis] : Scoliosis [Facet arthropathy] : Facet arthropathy [Disc space narrowing] : Disc space narrowing [de-identified] : Spine: Inspection/Palpation: No tenderness to palpation throughout Cervical/thoracic/lumbar spine. No bony stepoffs, No lesions.   Gait: Non-antalgic, able to perform bilateral toe and heel rise.  Able to perform tandem gait.    Range of Motion: Cervical Spine: Flexion to chin to chest, extension to 70 degrees,  rotation 90 degrees bilaterally, Lateral flexion to 45 degrees bilaterally Lumbar Spine: Flexion to 90 degrees, Extension to 30 degrees, rotation 30 degrees bilaterally, lateral flexion to 30 degrees bilaterally.   Neurologic: Bilateral upper extremities 5/5 Deltoid/Biceps/Triceps/ Wrist Flexion/Wrist Extension/ / Intrinsics Except Bilateral Lower Extremities 5/5 Iliopsoas/Quadriceps/Hamstrings/ Tibialis Anterior/ Gastrocnemius. Extensor Hallucis Longus/ Flexor Hallucis Longus except   Sensation intact to light touch C5-T1 Sensation intact to light touch L2-S1   Biceps/Triceps/Brachioradialis Reflex within normal limits Patellar/ Achilles reflex within normal limits.   Negative Ac's,  No inverted brachioradialis reflex   Negative straight leg raise
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
## 2024-10-18 ENCOUNTER — APPOINTMENT (OUTPATIENT)
Dept: OBGYN | Facility: CLINIC | Age: 79
End: 2024-10-18
Payer: MEDICARE

## 2024-10-18 VITALS
WEIGHT: 152 LBS | HEART RATE: 71 BPM | HEIGHT: 57 IN | SYSTOLIC BLOOD PRESSURE: 125 MMHG | DIASTOLIC BLOOD PRESSURE: 79 MMHG | BODY MASS INDEX: 32.79 KG/M2

## 2024-10-18 DIAGNOSIS — Z12.31 ENCOUNTER FOR SCREENING MAMMOGRAM FOR MALIGNANT NEOPLASM OF BREAST: ICD-10-CM

## 2024-10-18 DIAGNOSIS — N90.5 ATROPHY OF VULVA: ICD-10-CM

## 2024-10-18 DIAGNOSIS — Z01.419 ENCOUNTER FOR GYNECOLOGICAL EXAMINATION (GENERAL) (ROUTINE) W/OUT ABNORMAL FINDINGS: ICD-10-CM

## 2024-10-18 DIAGNOSIS — Z12.39 ENCOUNTER FOR OTHER SCREENING FOR MALIGNANT NEOPLASM OF BREAST: ICD-10-CM

## 2024-10-18 DIAGNOSIS — Z12.4 ENCOUNTER FOR SCREENING FOR MALIGNANT NEOPLASM OF CERVIX: ICD-10-CM

## 2024-10-18 PROCEDURE — G0101: CPT

## 2024-10-18 RX ORDER — CLOBETASOL PROPIONATE 0.5 MG/G
0.05 CREAM TOPICAL
Qty: 15 | Refills: 3 | Status: ACTIVE | COMMUNITY
Start: 2024-10-18 | End: 1900-01-01

## 2024-11-21 NOTE — OCCUPATIONAL THERAPY INITIAL EVALUATION ADULT - RANGE OF MOTION EXAMINATION, UPPER EXTREMITY
s/p R TSA (no AROM R shoulder)/Left UE Active ROM was WFL (within functional limits) [facility]

## (undated) DEVICE — PACK TOTAL KNEE

## (undated) DEVICE — ELCTR STRYKER NEPTUNE SMOKE EVACUATION PENCIL (GREEN)

## (undated) DEVICE — MAKO DRAPE KIT

## (undated) DEVICE — DRSG STOCKINETTE TUBULAR COTTON 1PLY 6X72"

## (undated) DEVICE — WARMING BLANKET UPPER ADULT

## (undated) DEVICE — SOL IRR BAG NS 0.9% 3000ML

## (undated) DEVICE — GLV 8 PROTEXIS (BLUE)

## (undated) DEVICE — SAW BLADE STRYKER WIDE MED 25MM X 73MM X 0.89MM

## (undated) DEVICE — SUT VICRYL 1 36" CTX UNDYED

## (undated) DEVICE — STRYKER MIXEVAC 3 BONE CEMENT MIXER

## (undated) DEVICE — SAW BLADE STRYKER SAGITTAL 25X98.5X1.24MM

## (undated) DEVICE — DRSG STOCKINETTE TUBULAR COTTON 1PLY 4X36"

## (undated) DEVICE — DRAPE 3/4 SHEET 52X76"

## (undated) DEVICE — DRSG PICO NPWT 4X12"

## (undated) DEVICE — SUCTION YANKAUER TAPERED BULBOUS NO VENT

## (undated) DEVICE — CRYO/CUFF GRAVITY COOLER KNEE LARGE

## (undated) DEVICE — NDL HYPO SAFE 20G X 1.5" (YELLOW)

## (undated) DEVICE — DRAPE IOBAN 33" X 23"

## (undated) DEVICE — SUT VLOC 90 4-0 18" P-12 UNDYED

## (undated) DEVICE — GLV 8 PROTEXIS (WHITE)

## (undated) DEVICE — HOOD FLYTE STRYKER HELMET SHIELD

## (undated) DEVICE — MAKO BLADE STANDARD

## (undated) DEVICE — DRAPE 1/2 SHEET 40X57"

## (undated) DEVICE — MAKO BLADE NARROW

## (undated) DEVICE — HOOD T5 PEELAWAY

## (undated) DEVICE — SAW BLADE STRYKER SAGITTAL 3 HOLE OSCILLATING

## (undated) DEVICE — PREP CHLORAPREP HI-LITE ORANGE 26ML

## (undated) DEVICE — DRSG STOCKINETTE TUBULAR COTTON 2PLY 6X72"

## (undated) DEVICE — DRAPE STERI-DRAPE INCISE 32X33"

## (undated) DEVICE — SUT VICRYL 2-0 36" CT-1 UNDYED

## (undated) DEVICE — ZIMMER PULSAVAC PLUS FAN KIT

## (undated) DEVICE — MAKO VIZADISC KNEE TRACKING KIT

## (undated) DEVICE — SYR LUER LOK 20CC